# Patient Record
Sex: MALE | Race: WHITE | Employment: OTHER | URBAN - METROPOLITAN AREA
[De-identification: names, ages, dates, MRNs, and addresses within clinical notes are randomized per-mention and may not be internally consistent; named-entity substitution may affect disease eponyms.]

---

## 2017-01-26 PROBLEM — R06.09 DYSPNEA ON EXERTION: Chronic | Status: ACTIVE | Noted: 2017-01-26

## 2020-03-27 PROBLEM — I50.23 ACUTE ON CHRONIC SYSTOLIC HEART FAILURE (HCC): Status: ACTIVE | Noted: 2020-03-27

## 2020-07-02 PROBLEM — I50.22 CHRONIC HFREF (HEART FAILURE WITH REDUCED EJECTION FRACTION) (HCC): Status: ACTIVE | Noted: 2020-07-02

## 2021-01-07 PROBLEM — J61 ASBESTOSIS (HCC): Status: ACTIVE | Noted: 2021-01-07

## 2022-01-01 ENCOUNTER — HOSPITAL ENCOUNTER (INPATIENT)
Age: 72
LOS: 2 days | Discharge: HOME HEALTH | DRG: 291 | End: 2022-01-21
Attending: EMERGENCY MEDICINE | Admitting: INTERNAL MEDICINE
Payer: MEDICARE

## 2022-01-01 ENCOUNTER — HOSPITAL ENCOUNTER (INPATIENT)
Dept: NON INVASIVE DIAGNOSTICS | Age: 72
Discharge: HOME | DRG: 291 | End: 2022-01-19
Attending: INTERNAL MEDICINE
Payer: MEDICARE

## 2022-01-01 ENCOUNTER — APPOINTMENT (OUTPATIENT)
Dept: GENERAL RADIOLOGY | Age: 72
DRG: 291 | End: 2022-01-01
Attending: EMERGENCY MEDICINE
Payer: MEDICARE

## 2022-01-01 VITALS
TEMPERATURE: 98 F | DIASTOLIC BLOOD PRESSURE: 73 MMHG | BODY MASS INDEX: 23.12 KG/M2 | HEIGHT: 68 IN | HEART RATE: 103 BPM | SYSTOLIC BLOOD PRESSURE: 102 MMHG | RESPIRATION RATE: 18 BRPM | OXYGEN SATURATION: 96 % | WEIGHT: 152.56 LBS

## 2022-01-01 VITALS
SYSTOLIC BLOOD PRESSURE: 110 MMHG | HEIGHT: 68 IN | DIASTOLIC BLOOD PRESSURE: 90 MMHG | WEIGHT: 157 LBS | BODY MASS INDEX: 23.79 KG/M2

## 2022-01-01 DIAGNOSIS — J61 ASBESTOSIS (HCC): ICD-10-CM

## 2022-01-01 DIAGNOSIS — I50.9 ACUTE ON CHRONIC CONGESTIVE HEART FAILURE, UNSPECIFIED HEART FAILURE TYPE (HCC): Primary | ICD-10-CM

## 2022-01-01 DIAGNOSIS — I50.23 ACUTE ON CHRONIC SYSTOLIC HEART FAILURE (HCC): ICD-10-CM

## 2022-01-01 DIAGNOSIS — E78.2 MIXED HYPERLIPIDEMIA: ICD-10-CM

## 2022-01-01 DIAGNOSIS — I10 ESSENTIAL HYPERTENSION: ICD-10-CM

## 2022-01-01 DIAGNOSIS — R06.09 DYSPNEA ON EXERTION: Chronic | ICD-10-CM

## 2022-01-01 LAB
ALBUMIN SERPL-MCNC: 2.8 G/DL (ref 3.2–4.6)
ALBUMIN SERPL-MCNC: 2.9 G/DL (ref 3.2–4.6)
ALBUMIN SERPL-MCNC: 3.3 G/DL (ref 3.2–4.6)
ALBUMIN/GLOB SERPL: 0.7 {RATIO} (ref 1.2–3.5)
ALBUMIN/GLOB SERPL: 0.8 {RATIO} (ref 1.2–3.5)
ALBUMIN/GLOB SERPL: 0.8 {RATIO} (ref 1.2–3.5)
ALP SERPL-CCNC: 104 U/L (ref 50–136)
ALP SERPL-CCNC: 80 U/L (ref 50–136)
ALP SERPL-CCNC: 83 U/L (ref 50–136)
ALT SERPL-CCNC: 113 U/L (ref 12–65)
ALT SERPL-CCNC: 130 U/L (ref 12–65)
ALT SERPL-CCNC: 133 U/L (ref 12–65)
ANION GAP SERPL CALC-SCNC: 9 MMOL/L (ref 7–16)
AST SERPL-CCNC: 101 U/L (ref 15–37)
AST SERPL-CCNC: 105 U/L (ref 15–37)
AST SERPL-CCNC: 67 U/L (ref 15–37)
ATRIAL RATE: 109 BPM
BASOPHILS # BLD: 0 K/UL (ref 0–0.2)
BASOPHILS # BLD: 0 K/UL (ref 0–0.2)
BASOPHILS # BLD: 0.1 K/UL (ref 0–0.2)
BASOPHILS NFR BLD: 1 % (ref 0–2)
BILIRUB SERPL-MCNC: 1.7 MG/DL (ref 0.2–1.1)
BILIRUB SERPL-MCNC: 2.2 MG/DL (ref 0.2–1.1)
BILIRUB SERPL-MCNC: 2.4 MG/DL (ref 0.2–1.1)
BNP SERPL-MCNC: 5045 PG/ML (ref 5–125)
BUN SERPL-MCNC: 21 MG/DL (ref 8–23)
CALCIUM SERPL-MCNC: 8.8 MG/DL (ref 8.3–10.4)
CALCIUM SERPL-MCNC: 9 MG/DL (ref 8.3–10.4)
CALCIUM SERPL-MCNC: 9.4 MG/DL (ref 8.3–10.4)
CALCULATED P AXIS, ECG09: 73 DEGREES
CALCULATED R AXIS, ECG10: -4 DEGREES
CALCULATED T AXIS, ECG11: 71 DEGREES
CHLORIDE SERPL-SCNC: 94 MMOL/L (ref 98–107)
CHLORIDE SERPL-SCNC: 94 MMOL/L (ref 98–107)
CHLORIDE SERPL-SCNC: 96 MMOL/L (ref 98–107)
CO2 SERPL-SCNC: 28 MMOL/L (ref 21–32)
CO2 SERPL-SCNC: 29 MMOL/L (ref 21–32)
CO2 SERPL-SCNC: 30 MMOL/L (ref 21–32)
COVID-19 RAPID TEST, COVR: NOT DETECTED
CREAT SERPL-MCNC: 0.88 MG/DL (ref 0.8–1.5)
CREAT SERPL-MCNC: 0.94 MG/DL (ref 0.8–1.5)
CREAT SERPL-MCNC: 1.1 MG/DL (ref 0.8–1.5)
DIAGNOSIS, 93000: NORMAL
DIFFERENTIAL METHOD BLD: ABNORMAL
ECHO AO ASC DIAM: 3 CM
ECHO AO ASCENDING AORTA INDEX: 1.63 CM/M2
ECHO AO ROOT DIAM: 2.9 CM
ECHO AO ROOT INDEX: 1.58 CM/M2
ECHO AV AREA PEAK VELOCITY: 1.2 CM2
ECHO AV AREA VTI: 1.3 CM2
ECHO AV AREA/BSA PEAK VELOCITY: 0.7 CM2/M2
ECHO AV AREA/BSA VTI: 0.7 CM2/M2
ECHO AV MEAN GRADIENT: 3 MMHG
ECHO AV MEAN VELOCITY: 0.8 M/S
ECHO AV PEAK GRADIENT: 5 MMHG
ECHO AV PEAK VELOCITY: 1.1 M/S
ECHO AV VELOCITY RATIO: 0.36
ECHO AV VTI: 15 CM
ECHO EST RA PRESSURE: 15 MMHG
ECHO IVC EXP: 2.2 CM
ECHO LA AREA 2C: 29 CM2
ECHO LA AREA 4C: 26.8 CM2
ECHO LA DIAMETER INDEX: 3.1 CM/M2
ECHO LA DIAMETER: 5.7 CM
ECHO LA MAJOR AXIS: 6.3 CM
ECHO LA MINOR AXIS: 6.5 CM
ECHO LA TO AORTIC ROOT RATIO: 1.97
ECHO LA VOL BP: 99 ML (ref 18–58)
ECHO LA VOL/BSA BIPLANE: 54 ML/M2 (ref 16–34)
ECHO LV E' LATERAL VELOCITY: 18 CM/S
ECHO LV E' SEPTAL VELOCITY: 5 CM/S
ECHO LV EDV A2C: 328 ML
ECHO LV EDV A4C: 272 ML
ECHO LV EDV BP: 303 ML (ref 67–155)
ECHO LV EDV INDEX A4C: 148 ML/M2
ECHO LV EDV INDEX BP: 165 ML/M2
ECHO LV EDV NDEX A2C: 178 ML/M2
ECHO LV EJECTION FRACTION A2C: 19 %
ECHO LV EJECTION FRACTION A4C: 10 %
ECHO LV EJECTION FRACTION BIPLANE: 13 % (ref 55–100)
ECHO LV ESV A2C: 266 ML
ECHO LV ESV A4C: 246 ML
ECHO LV ESV INDEX A2C: 145 ML/M2
ECHO LV ESV INDEX A4C: 134 ML/M2
ECHO LV FRACTIONAL SHORTENING: 5 % (ref 28–44)
ECHO LV INTERNAL DIMENSION DIASTOLE INDEX: 3.37 CM/M2
ECHO LV INTERNAL DIMENSION DIASTOLIC: 6.2 CM (ref 4.2–5.9)
ECHO LV INTERNAL DIMENSION SYSTOLIC INDEX: 3.21 CM/M2
ECHO LV INTERNAL DIMENSION SYSTOLIC: 5.9 CM
ECHO LV IVSD: 1 CM (ref 0.6–1)
ECHO LV MASS 2D: 261 G (ref 88–224)
ECHO LV MASS INDEX 2D: 141.9 G/M2 (ref 49–115)
ECHO LV POSTERIOR WALL DIASTOLIC: 1 CM (ref 0.6–1)
ECHO LV RELATIVE WALL THICKNESS RATIO: 0.32
ECHO LVOT AREA: 3.1 CM2
ECHO LVOT AV VTI INDEX: 0.41
ECHO LVOT DIAM: 2 CM
ECHO LVOT MEAN GRADIENT: 0 MMHG
ECHO LVOT PEAK GRADIENT: 1 MMHG
ECHO LVOT PEAK VELOCITY: 0.4 M/S
ECHO LVOT STROKE VOLUME INDEX: 10.4 ML/M2
ECHO LVOT SV: 19.2 ML
ECHO LVOT VTI: 6.1 CM
ECHO MV E DECELERATION TIME (DT): 173 MS
ECHO MV E VELOCITY: 1.33 M/S
ECHO MV E/E' LATERAL: 7.39
ECHO MV E/E' RATIO (AVERAGED): 16.99
ECHO MV E/E' SEPTAL: 26.6
ECHO MV EROA PISA: 46.9 CM2
ECHO MV REGURGITANT ALIASING (NYQUIST) VELOCITY: 56 CM/S
ECHO MV REGURGITANT PEAK GRADIENT: 92 MMHG
ECHO MV REGURGITANT PEAK VELOCITY: 4.8 M/S
ECHO MV REGURGITANT RADIUS PISA: 0.8 CM
ECHO MV REGURGITANT VOLUME PISA: 5111.08 ML
ECHO MV REGURGITANT VTIA: 109 CM
ECHO RIGHT VENTRICULAR SYSTOLIC PRESSURE (RVSP): 67 MMHG
ECHO RV BASAL DIMENSION: 4.1 CM
ECHO RV INTERNAL DIMENSION: 3.4 CM
ECHO RV MID DIMENSION: 2.7 CM
ECHO RV TAPSE: 1.5 CM (ref 1.5–2)
ECHO TV REGURGITANT MAX VELOCITY: 3.62 M/S
ECHO TV REGURGITANT PEAK GRADIENT: 52 MMHG
EOSINOPHIL # BLD: 0.1 K/UL (ref 0–0.8)
EOSINOPHIL # BLD: 0.2 K/UL (ref 0–0.8)
EOSINOPHIL # BLD: 0.2 K/UL (ref 0–0.8)
EOSINOPHIL NFR BLD: 2 % (ref 0.5–7.8)
EOSINOPHIL NFR BLD: 3 % (ref 0.5–7.8)
EOSINOPHIL NFR BLD: 4 % (ref 0.5–7.8)
ERYTHROCYTE [DISTWIDTH] IN BLOOD BY AUTOMATED COUNT: 13.8 % (ref 11.9–14.6)
ERYTHROCYTE [DISTWIDTH] IN BLOOD BY AUTOMATED COUNT: 13.9 % (ref 11.9–14.6)
ERYTHROCYTE [DISTWIDTH] IN BLOOD BY AUTOMATED COUNT: 14.1 % (ref 11.9–14.6)
GLOBULIN SER CALC-MCNC: 3.7 G/DL (ref 2.3–3.5)
GLOBULIN SER CALC-MCNC: 3.8 G/DL (ref 2.3–3.5)
GLOBULIN SER CALC-MCNC: 4.4 G/DL (ref 2.3–3.5)
GLUCOSE SERPL-MCNC: 119 MG/DL (ref 65–100)
GLUCOSE SERPL-MCNC: 91 MG/DL (ref 65–100)
GLUCOSE SERPL-MCNC: 97 MG/DL (ref 65–100)
HAV IGM SER QL: NONREACTIVE
HBV CORE IGM SER QL: NONREACTIVE
HBV SURFACE AG SER QL: NONREACTIVE
HCT VFR BLD AUTO: 38.7 % (ref 41.1–50.3)
HCT VFR BLD AUTO: 39 % (ref 41.1–50.3)
HCT VFR BLD AUTO: 42.5 % (ref 41.1–50.3)
HCV AB SER QL: NONREACTIVE
HGB BLD-MCNC: 12.3 G/DL (ref 13.6–17.2)
HGB BLD-MCNC: 12.4 G/DL (ref 13.6–17.2)
HGB BLD-MCNC: 13.8 G/DL (ref 13.6–17.2)
IMM GRANULOCYTES # BLD AUTO: 0 K/UL (ref 0–0.5)
IMM GRANULOCYTES NFR BLD AUTO: 0 % (ref 0–5)
LYMPHOCYTES # BLD: 0.9 K/UL (ref 0.5–4.6)
LYMPHOCYTES # BLD: 1.1 K/UL (ref 0.5–4.6)
LYMPHOCYTES # BLD: 1.2 K/UL (ref 0.5–4.6)
LYMPHOCYTES NFR BLD: 12 % (ref 13–44)
LYMPHOCYTES NFR BLD: 19 % (ref 13–44)
LYMPHOCYTES NFR BLD: 22 % (ref 13–44)
MAGNESIUM SERPL-MCNC: 1.9 MG/DL (ref 1.8–2.4)
MAGNESIUM SERPL-MCNC: 2 MG/DL (ref 1.8–2.4)
MAGNESIUM SERPL-MCNC: 2.2 MG/DL (ref 1.8–2.4)
MAGNESIUM SERPL-MCNC: 2.2 MG/DL (ref 1.8–2.4)
MCH RBC QN AUTO: 30 PG (ref 26.1–32.9)
MCH RBC QN AUTO: 30.2 PG (ref 26.1–32.9)
MCH RBC QN AUTO: 30.3 PG (ref 26.1–32.9)
MCHC RBC AUTO-ENTMCNC: 31.8 G/DL (ref 31.4–35)
MCHC RBC AUTO-ENTMCNC: 31.8 G/DL (ref 31.4–35)
MCHC RBC AUTO-ENTMCNC: 32.5 G/DL (ref 31.4–35)
MCV RBC AUTO: 93.2 FL (ref 79.6–97.8)
MCV RBC AUTO: 94.4 FL (ref 79.6–97.8)
MCV RBC AUTO: 94.9 FL (ref 79.6–97.8)
MONOCYTES # BLD: 0.4 K/UL (ref 0.1–1.3)
MONOCYTES # BLD: 0.4 K/UL (ref 0.1–1.3)
MONOCYTES # BLD: 0.5 K/UL (ref 0.1–1.3)
MONOCYTES NFR BLD: 6 % (ref 4–12)
MONOCYTES NFR BLD: 7 % (ref 4–12)
MONOCYTES NFR BLD: 8 % (ref 4–12)
NEUTS SEG # BLD: 3.5 K/UL (ref 1.7–8.2)
NEUTS SEG # BLD: 4 K/UL (ref 1.7–8.2)
NEUTS SEG # BLD: 6.1 K/UL (ref 1.7–8.2)
NEUTS SEG NFR BLD: 66 % (ref 43–78)
NEUTS SEG NFR BLD: 69 % (ref 43–78)
NEUTS SEG NFR BLD: 80 % (ref 43–78)
NRBC # BLD: 0 K/UL (ref 0–0.2)
P-R INTERVAL, ECG05: 180 MS
PLATELET # BLD AUTO: 234 K/UL (ref 150–450)
PLATELET # BLD AUTO: 234 K/UL (ref 150–450)
PLATELET # BLD AUTO: 285 K/UL (ref 150–450)
PMV BLD AUTO: 10 FL (ref 9.4–12.3)
PMV BLD AUTO: 10.3 FL (ref 9.4–12.3)
PMV BLD AUTO: 9.8 FL (ref 9.4–12.3)
POTASSIUM SERPL-SCNC: 3.1 MMOL/L (ref 3.5–5.1)
POTASSIUM SERPL-SCNC: 3.1 MMOL/L (ref 3.5–5.1)
POTASSIUM SERPL-SCNC: 3.2 MMOL/L (ref 3.5–5.1)
POTASSIUM SERPL-SCNC: 3.6 MMOL/L (ref 3.5–5.1)
POTASSIUM SERPL-SCNC: 4.1 MMOL/L (ref 3.5–5.1)
PROT SERPL-MCNC: 6.6 G/DL (ref 6.3–8.2)
PROT SERPL-MCNC: 6.6 G/DL (ref 6.3–8.2)
PROT SERPL-MCNC: 7.7 G/DL (ref 6.3–8.2)
Q-T INTERVAL, ECG07: 358 MS
QRS DURATION, ECG06: 116 MS
QTC CALCULATION (BEZET), ECG08: 482 MS
RBC # BLD AUTO: 4.1 M/UL (ref 4.23–5.6)
RBC # BLD AUTO: 4.11 M/UL (ref 4.23–5.6)
RBC # BLD AUTO: 4.56 M/UL (ref 4.23–5.6)
SODIUM SERPL-SCNC: 132 MMOL/L (ref 138–145)
SODIUM SERPL-SCNC: 133 MMOL/L (ref 136–145)
SODIUM SERPL-SCNC: 133 MMOL/L (ref 138–145)
SOURCE, COVRS: NORMAL
TROPONIN-HIGH SENSITIVITY: 51.2 PG/ML (ref 0–14)
TROPONIN-HIGH SENSITIVITY: 59.7 PG/ML (ref 0–14)
TROPONIN-HIGH SENSITIVITY: 64.4 PG/ML (ref 0–14)
VENTRICULAR RATE, ECG03: 109 BPM
WBC # BLD AUTO: 5.4 K/UL (ref 4.3–11.1)
WBC # BLD AUTO: 5.8 K/UL (ref 4.3–11.1)
WBC # BLD AUTO: 7.7 K/UL (ref 4.3–11.1)

## 2022-01-01 PROCEDURE — 85025 COMPLETE CBC W/AUTO DIFF WBC: CPT

## 2022-01-01 PROCEDURE — 96374 THER/PROPH/DIAG INJ IV PUSH: CPT

## 2022-01-01 PROCEDURE — 36415 COLL VENOUS BLD VENIPUNCTURE: CPT

## 2022-01-01 PROCEDURE — 97530 THERAPEUTIC ACTIVITIES: CPT

## 2022-01-01 PROCEDURE — 74011000250 HC RX REV CODE- 250: Performed by: EMERGENCY MEDICINE

## 2022-01-01 PROCEDURE — 74011250636 HC RX REV CODE- 250/636: Performed by: INTERNAL MEDICINE

## 2022-01-01 PROCEDURE — 74011000250 HC RX REV CODE- 250: Performed by: INTERNAL MEDICINE

## 2022-01-01 PROCEDURE — 83735 ASSAY OF MAGNESIUM: CPT

## 2022-01-01 PROCEDURE — 80053 COMPREHEN METABOLIC PANEL: CPT

## 2022-01-01 PROCEDURE — 74011250636 HC RX REV CODE- 250/636: Performed by: PHYSICIAN ASSISTANT

## 2022-01-01 PROCEDURE — 84132 ASSAY OF SERUM POTASSIUM: CPT

## 2022-01-01 PROCEDURE — 74011250637 HC RX REV CODE- 250/637: Performed by: INTERNAL MEDICINE

## 2022-01-01 PROCEDURE — 65660000000 HC RM CCU STEPDOWN

## 2022-01-01 PROCEDURE — 71045 X-RAY EXAM CHEST 1 VIEW: CPT

## 2022-01-01 PROCEDURE — 84484 ASSAY OF TROPONIN QUANT: CPT

## 2022-01-01 PROCEDURE — 77010033678 HC OXYGEN DAILY

## 2022-01-01 PROCEDURE — 93005 ELECTROCARDIOGRAM TRACING: CPT | Performed by: EMERGENCY MEDICINE

## 2022-01-01 PROCEDURE — 86580 TB INTRADERMAL TEST: CPT | Performed by: INTERNAL MEDICINE

## 2022-01-01 PROCEDURE — 94762 N-INVAS EAR/PLS OXIMTRY CONT: CPT

## 2022-01-01 PROCEDURE — 80074 ACUTE HEPATITIS PANEL: CPT

## 2022-01-01 PROCEDURE — 97161 PT EVAL LOW COMPLEX 20 MIN: CPT

## 2022-01-01 PROCEDURE — C8929 TTE W OR WO FOL WCON,DOPPLER: HCPCS

## 2022-01-01 PROCEDURE — 74011250636 HC RX REV CODE- 250/636: Performed by: EMERGENCY MEDICINE

## 2022-01-01 PROCEDURE — 94760 N-INVAS EAR/PLS OXIMETRY 1: CPT

## 2022-01-01 PROCEDURE — 97116 GAIT TRAINING THERAPY: CPT

## 2022-01-01 PROCEDURE — 74011250637 HC RX REV CODE- 250/637: Performed by: HOSPITALIST

## 2022-01-01 PROCEDURE — 97112 NEUROMUSCULAR REEDUCATION: CPT

## 2022-01-01 PROCEDURE — 97165 OT EVAL LOW COMPLEX 30 MIN: CPT

## 2022-01-01 PROCEDURE — 87635 SARS-COV-2 COVID-19 AMP PRB: CPT

## 2022-01-01 PROCEDURE — 83880 ASSAY OF NATRIURETIC PEPTIDE: CPT

## 2022-01-01 PROCEDURE — 99232 SBSQ HOSP IP/OBS MODERATE 35: CPT | Performed by: INTERNAL MEDICINE

## 2022-01-01 PROCEDURE — 99285 EMERGENCY DEPT VISIT HI MDM: CPT

## 2022-01-01 PROCEDURE — 99223 1ST HOSP IP/OBS HIGH 75: CPT | Performed by: INTERNAL MEDICINE

## 2022-01-01 RX ORDER — SODIUM CHLORIDE 0.9 % (FLUSH) 0.9 %
5-10 SYRINGE (ML) INJECTION EVERY 8 HOURS
Status: DISCONTINUED | OUTPATIENT
Start: 2022-01-01 | End: 2022-01-01 | Stop reason: HOSPADM

## 2022-01-01 RX ORDER — METOPROLOL SUCCINATE 25 MG/1
25 TABLET, EXTENDED RELEASE ORAL DAILY
Status: DISCONTINUED | OUTPATIENT
Start: 2022-01-01 | End: 2022-01-01 | Stop reason: HOSPADM

## 2022-01-01 RX ORDER — ATORVASTATIN CALCIUM 20 MG/1
20 TABLET, FILM COATED ORAL DAILY
Status: DISCONTINUED | OUTPATIENT
Start: 2022-01-01 | End: 2022-01-01 | Stop reason: HOSPADM

## 2022-01-01 RX ORDER — POTASSIUM CHLORIDE 20 MEQ/1
40 TABLET, EXTENDED RELEASE ORAL
Status: COMPLETED | OUTPATIENT
Start: 2022-01-01 | End: 2022-01-01

## 2022-01-01 RX ORDER — SODIUM CHLORIDE 0.9 % (FLUSH) 0.9 %
5-40 SYRINGE (ML) INJECTION EVERY 8 HOURS
Status: DISCONTINUED | OUTPATIENT
Start: 2022-01-01 | End: 2022-01-01 | Stop reason: HOSPADM

## 2022-01-01 RX ORDER — ENOXAPARIN SODIUM 100 MG/ML
40 INJECTION SUBCUTANEOUS DAILY
Status: DISCONTINUED | OUTPATIENT
Start: 2022-01-01 | End: 2022-01-01 | Stop reason: HOSPADM

## 2022-01-01 RX ORDER — FUROSEMIDE 10 MG/ML
80 INJECTION INTRAMUSCULAR; INTRAVENOUS EVERY 12 HOURS
Status: DISCONTINUED | OUTPATIENT
Start: 2022-01-01 | End: 2022-01-01 | Stop reason: HOSPADM

## 2022-01-01 RX ORDER — ONDANSETRON 4 MG/1
4 TABLET, ORALLY DISINTEGRATING ORAL
Status: DISCONTINUED | OUTPATIENT
Start: 2022-01-01 | End: 2022-01-01 | Stop reason: HOSPADM

## 2022-01-01 RX ORDER — BUMETANIDE 1 MG/1
TABLET ORAL
Qty: 45 TABLET | Refills: 0 | Status: SHIPPED | OUTPATIENT
Start: 2022-01-01 | End: 2022-01-01 | Stop reason: DRUGHIGH

## 2022-01-01 RX ORDER — SPIRONOLACTONE 25 MG/1
25 TABLET ORAL DAILY
Status: DISCONTINUED | OUTPATIENT
Start: 2022-01-01 | End: 2022-01-01 | Stop reason: HOSPADM

## 2022-01-01 RX ORDER — FUROSEMIDE 10 MG/ML
60 INJECTION INTRAMUSCULAR; INTRAVENOUS
Status: COMPLETED | OUTPATIENT
Start: 2022-01-01 | End: 2022-01-01

## 2022-01-01 RX ORDER — SODIUM CHLORIDE 0.9 % (FLUSH) 0.9 %
5-10 SYRINGE (ML) INJECTION AS NEEDED
Status: DISCONTINUED | OUTPATIENT
Start: 2022-01-01 | End: 2022-01-01 | Stop reason: HOSPADM

## 2022-01-01 RX ORDER — SODIUM CHLORIDE 0.9 % (FLUSH) 0.9 %
5-40 SYRINGE (ML) INJECTION AS NEEDED
Status: DISCONTINUED | OUTPATIENT
Start: 2022-01-01 | End: 2022-01-01 | Stop reason: HOSPADM

## 2022-01-01 RX ORDER — ACETAMINOPHEN 650 MG/1
650 SUPPOSITORY RECTAL
Status: DISCONTINUED | OUTPATIENT
Start: 2022-01-01 | End: 2022-01-01 | Stop reason: HOSPADM

## 2022-01-01 RX ORDER — CHOLECALCIFEROL (VITAMIN D3) 125 MCG
5 CAPSULE ORAL
Status: DISCONTINUED | OUTPATIENT
Start: 2022-01-01 | End: 2022-01-01

## 2022-01-01 RX ORDER — DIPHENHYDRAMINE HCL 25 MG
25 CAPSULE ORAL ONCE
Status: COMPLETED | OUTPATIENT
Start: 2022-01-01 | End: 2022-01-01

## 2022-01-01 RX ORDER — FUROSEMIDE 10 MG/ML
40 INJECTION INTRAMUSCULAR; INTRAVENOUS EVERY 12 HOURS
Status: DISCONTINUED | OUTPATIENT
Start: 2022-01-01 | End: 2022-01-01

## 2022-01-01 RX ORDER — PANTOPRAZOLE SODIUM 40 MG/1
40 TABLET, DELAYED RELEASE ORAL
Status: DISCONTINUED | OUTPATIENT
Start: 2022-01-01 | End: 2022-01-01 | Stop reason: HOSPADM

## 2022-01-01 RX ORDER — CHOLECALCIFEROL (VITAMIN D3) 125 MCG
5 CAPSULE ORAL
Status: DISCONTINUED | OUTPATIENT
Start: 2022-01-01 | End: 2022-01-01 | Stop reason: HOSPADM

## 2022-01-01 RX ORDER — LOSARTAN POTASSIUM 50 MG/1
50 TABLET ORAL DAILY
Status: DISCONTINUED | OUTPATIENT
Start: 2022-01-01 | End: 2022-01-01 | Stop reason: HOSPADM

## 2022-01-01 RX ORDER — ONDANSETRON 2 MG/ML
4 INJECTION INTRAMUSCULAR; INTRAVENOUS
Status: DISCONTINUED | OUTPATIENT
Start: 2022-01-01 | End: 2022-01-01 | Stop reason: HOSPADM

## 2022-01-01 RX ORDER — POLYETHYLENE GLYCOL 3350 17 G/17G
17 POWDER, FOR SOLUTION ORAL DAILY PRN
Status: DISCONTINUED | OUTPATIENT
Start: 2022-01-01 | End: 2022-01-01 | Stop reason: HOSPADM

## 2022-01-01 RX ORDER — ACETAMINOPHEN 325 MG/1
650 TABLET ORAL
Status: DISCONTINUED | OUTPATIENT
Start: 2022-01-01 | End: 2022-01-01 | Stop reason: HOSPADM

## 2022-01-01 RX ORDER — ASPIRIN 81 MG/1
81 TABLET ORAL DAILY
Status: DISCONTINUED | OUTPATIENT
Start: 2022-01-01 | End: 2022-01-01 | Stop reason: HOSPADM

## 2022-01-01 RX ORDER — SPIRONOLACTONE 25 MG/1
25 TABLET ORAL DAILY
Qty: 30 TABLET | Refills: 0 | Status: SHIPPED | OUTPATIENT
Start: 2022-01-01

## 2022-01-01 RX ADMIN — SODIUM CHLORIDE, PRESERVATIVE FREE 10 ML: 5 INJECTION INTRAVENOUS at 05:07

## 2022-01-01 RX ADMIN — PANTOPRAZOLE SODIUM 40 MG: 40 TABLET, DELAYED RELEASE ORAL at 05:07

## 2022-01-01 RX ADMIN — FUROSEMIDE 80 MG: 10 INJECTION, SOLUTION INTRAMUSCULAR; INTRAVENOUS at 08:39

## 2022-01-01 RX ADMIN — ASPIRIN 81 MG: 81 TABLET ORAL at 08:25

## 2022-01-01 RX ADMIN — LOSARTAN POTASSIUM 50 MG: 50 TABLET, FILM COATED ORAL at 09:05

## 2022-01-01 RX ADMIN — ENOXAPARIN SODIUM 40 MG: 100 INJECTION SUBCUTANEOUS at 08:39

## 2022-01-01 RX ADMIN — POTASSIUM CHLORIDE 40 MEQ: 20 TABLET, EXTENDED RELEASE ORAL at 03:49

## 2022-01-01 RX ADMIN — SODIUM CHLORIDE, PRESERVATIVE FREE 10 ML: 5 INJECTION INTRAVENOUS at 05:03

## 2022-01-01 RX ADMIN — SODIUM CHLORIDE, PRESERVATIVE FREE 10 ML: 5 INJECTION INTRAVENOUS at 14:55

## 2022-01-01 RX ADMIN — TUBERCULIN PURIFIED PROTEIN DERIVATIVE 5 UNITS: 5 INJECTION, SOLUTION INTRADERMAL at 17:49

## 2022-01-01 RX ADMIN — LOSARTAN POTASSIUM 50 MG: 50 TABLET, FILM COATED ORAL at 08:40

## 2022-01-01 RX ADMIN — Medication 5 MG: at 01:32

## 2022-01-01 RX ADMIN — SODIUM CHLORIDE, PRESERVATIVE FREE 10 ML: 5 INJECTION INTRAVENOUS at 21:16

## 2022-01-01 RX ADMIN — POTASSIUM CHLORIDE 40 MEQ: 20 TABLET, EXTENDED RELEASE ORAL at 08:40

## 2022-01-01 RX ADMIN — ASPIRIN 81 MG: 81 TABLET ORAL at 09:05

## 2022-01-01 RX ADMIN — SPIRONOLACTONE 25 MG: 25 TABLET ORAL at 08:40

## 2022-01-01 RX ADMIN — Medication 1 TABLET: at 08:40

## 2022-01-01 RX ADMIN — PERFLUTREN 1 ML: 6.52 INJECTION, SUSPENSION INTRAVENOUS at 15:03

## 2022-01-01 RX ADMIN — FUROSEMIDE 80 MG: 10 INJECTION, SOLUTION INTRAMUSCULAR; INTRAVENOUS at 20:39

## 2022-01-01 RX ADMIN — PANTOPRAZOLE SODIUM 40 MG: 40 TABLET, DELAYED RELEASE ORAL at 07:39

## 2022-01-01 RX ADMIN — ASPIRIN 81 MG: 81 TABLET ORAL at 08:40

## 2022-01-01 RX ADMIN — FUROSEMIDE 60 MG: 10 INJECTION, SOLUTION INTRAMUSCULAR; INTRAVENOUS at 22:10

## 2022-01-01 RX ADMIN — ENOXAPARIN SODIUM 40 MG: 100 INJECTION SUBCUTANEOUS at 08:25

## 2022-01-01 RX ADMIN — SODIUM CHLORIDE, PRESERVATIVE FREE 10 ML: 5 INJECTION INTRAVENOUS at 14:00

## 2022-01-01 RX ADMIN — DIPHENHYDRAMINE HYDROCHLORIDE 25 MG: 25 CAPSULE ORAL at 21:38

## 2022-01-01 RX ADMIN — METOPROLOL SUCCINATE 25 MG: 25 TABLET, EXTENDED RELEASE ORAL at 09:05

## 2022-01-01 RX ADMIN — POTASSIUM CHLORIDE 40 MEQ: 20 TABLET, EXTENDED RELEASE ORAL at 09:17

## 2022-01-01 RX ADMIN — POTASSIUM CHLORIDE 40 MEQ: 20 TABLET, EXTENDED RELEASE ORAL at 03:22

## 2022-01-01 RX ADMIN — METOPROLOL SUCCINATE 25 MG: 25 TABLET, EXTENDED RELEASE ORAL at 08:40

## 2022-01-01 RX ADMIN — METOPROLOL SUCCINATE 25 MG: 25 TABLET, EXTENDED RELEASE ORAL at 08:31

## 2022-01-01 RX ADMIN — SPIRONOLACTONE 25 MG: 25 TABLET ORAL at 09:05

## 2022-01-01 RX ADMIN — SODIUM CHLORIDE, PRESERVATIVE FREE 10 ML: 5 INJECTION INTRAVENOUS at 21:40

## 2022-01-01 RX ADMIN — ATORVASTATIN CALCIUM 20 MG: 40 TABLET, FILM COATED ORAL at 09:05

## 2022-01-01 RX ADMIN — Medication 1 TABLET: at 09:16

## 2022-01-01 RX ADMIN — SPIRONOLACTONE 25 MG: 25 TABLET ORAL at 11:04

## 2022-01-01 RX ADMIN — PANTOPRAZOLE SODIUM 40 MG: 40 TABLET, DELAYED RELEASE ORAL at 03:51

## 2022-01-01 RX ADMIN — ATORVASTATIN CALCIUM 20 MG: 40 TABLET, FILM COATED ORAL at 08:40

## 2022-01-01 RX ADMIN — ENOXAPARIN SODIUM 40 MG: 100 INJECTION SUBCUTANEOUS at 09:05

## 2022-01-01 RX ADMIN — FUROSEMIDE 40 MG: 10 INJECTION, SOLUTION INTRAMUSCULAR; INTRAVENOUS at 08:30

## 2022-01-01 RX ADMIN — LOSARTAN POTASSIUM 50 MG: 50 TABLET, FILM COATED ORAL at 11:04

## 2022-01-01 RX ADMIN — ATORVASTATIN CALCIUM 20 MG: 40 TABLET, FILM COATED ORAL at 08:25

## 2022-01-18 NOTE — ED TRIAGE NOTES
Pt arrives via [facility] to triage. Pt reports shortness of breath for a few months. Pt states he is having some fluid build up in his ankles. Pt states hx of CHF with states compliance with lasix at home. NAD. Masked.

## 2022-01-19 PROBLEM — I50.9 CHF (CONGESTIVE HEART FAILURE) (HCC): Status: ACTIVE | Noted: 2022-01-01
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
## 2022-01-19 NOTE — ED NOTES
70-year-old male presents to the ED with complaints of cough and shortness of breath, concern for CHF exacerbation. They deny fever or chills, no rapid weight gain, hemoptysis, syncope or near syncope, chest pain, other complaint. Completed in the ED by his family, who serves as primary historian. Patient evaluated initially in triage. Rapid Medical Evaluation was conducted and necessary orders have been placed. I have performed a medical screening exam.  Care will now be transferred to the provider in the back of the emergency department.   Justyn Pizano NP 8:01 PM

## 2022-01-19 NOTE — ED NOTES
TRANSFER - OUT REPORT:    Verbal report given to Otilia Kinney RN on Albert Bagley  being transferred to [address] [place] [address] for routine progression of care       Report consisted of patients Situation, Background, Assessment and   Recommendations(SBAR). Information from the following report(s) SBAR, Kardex, ED Summary, Intake/Output, MAR, Accordion and Recent Results was reviewed with the receiving nurse. Lines:   Peripheral IV 01/18/22 Right Antecubital (Active)   Site Assessment Clean, dry, & intact 01/18/22 1458   Phlebitis Assessment 0 01/18/22 1458   Infiltration Assessment 0 01/18/22 1458   Dressing Status Clean, dry, & intact 01/18/22 1458        Opportunity for questions and clarification was provided.       Patient transported with:   [employer]
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
## 2022-01-19 NOTE — PROGRESS NOTES
Pt resting in bed. All needs met. Hourly rounding completed. Bed low & locked position. Side rails up x2. Call light within reach. Will continue to monitor & report to oncoming nurse.

## 2022-01-19 NOTE — PROGRESS NOTES
Chart review complete, CM met with pt at bedside, per pt he lives alone in 2 level home states he only lives on 1st floor and has 4 steps to get into home, states he is on oxygen at home provided by [employer], he drives and is independent with ADLs, drives and affords medications without difficulty. Demographics, insurance and PCP confirmed. CM will remain available, no anticipated dc needs noted at this time. Care Management Interventions  PCP Verified by CM:  Yes ([facility], states has local [facility]iol[facility] Pulmonolgy and see them as needed)  MyChart Signup: No  Discharge Durable Medical Equipment: No  Physical Therapy Consult: No  Occupational Therapy Consult: No  Speech Therapy Consult: No  Support Systems: Child(yane)  Confirm Follow Up Transport: Family  Discharge Location  Patient Expects to be Discharged to[de-identified] Home

## 2022-01-19 NOTE — PROGRESS NOTES
01/19/22 1500   Dual Skin Pressure Injury Assessment   Dual Skin Pressure Injury Assessment WDL   Second Care Provider (Based on [address] [place] [address]) Marcy URBINA   Skin Integumentary   Skin Integumentary (WDL) WDL    Pressure  Injury Documentation No Pressure Injury Noted-Pressure Ulcer Prevention Initiated   Wound Prevention and Protection Methods   Orientation of Wound Prevention Posterior   Location of Wound Prevention Sacrum/Coccyx   Dressing Present  No   Wound Offloading (Prevention Methods) Bed, pressure reduction mattress;Repositioning

## 2022-01-19 NOTE — ACP (ADVANCE CARE PLANNING)
Advance Care Planning   Healthcare Decision Maker:   Primary decision maker is daughter Rachel Livingston [phone]        Click here to complete [phone] including selection of the Healthcare Decision Maker Relationship (ie \"Primary\")  Today we documented Decision Maker(s) consistent with Legal Next of Kin hierarchy.

## 2022-01-19 NOTE — PROGRESS NOTES
Attempted to round and getting ECHO, tech requested I return when study completed. admitted after midnight, chart reviewed. 71 yo male with PMH of CHF/ HFrEF admitted with acute on chronic hypoxic respiratory failure due to CHF exacerbation. COVID negative. CXR shows cardiomegaly. ECHO ordered. Receiving IV lasix every 12 hours. Has elevated LFTS possible congestive. Will followup labs and hepatitis panel. Continued diuresis. Wean O2 as tolerant.      Bernardo Willingham MD

## 2022-01-20 NOTE — PROGRESS NOTES
1/20/2022   OT order received and chart reviewed. Arrived and lights were out in the room. Despite encouragement for participation pt reports he is taking a nap and already warm in the bed. Pt declining participation today.   Thank you,  Thierno Quiroz, OT

## 2022-01-20 NOTE — PROGRESS NOTES
Hospitalist Progress Note   Admit Date:  2022  9:43 PM   Name:  Rukhsana Liriano   Age:  70 y.o. Sex:  male  :  1950   MRN:  [id number]   Room:  [facility]    Presenting Complaint: Peripheral Edema and Shortness of Breath    Reason(s) for Admission: CHF (congestive heart failure) [facility]) [I50.9]     Hospital Course & Interval History:   Rukhsana Liriano is a 70 y.o. male with medical history of previous congestive heart failure unknown ejection fraction of around 15% based on his prior echocardiograms, asbestosis along with hypertension at baseline. He presents to the hospital stating that he has been feeling poorly over the last week. Minimal amounts of activity are making him feel short of breath. He does endorse having some orthopnea with this as well. He denies any chest pain, palpitations, cough, congestion or productive sputum. He states that he does use oxygen as needed between 2 to 3 L and has been wearing his oxygen but despite this had no relief at home. His legs have progressively become more swollen. In the emergency room his BNP level was noted to be 5045. Chest x-ray revealed cardiomegaly. Clinically he does appear to be volume overloaded and likely having an exacerbation of his CHF so he was referred to the medical service for admission. Subjective (22): Patient found ambulating in room on 3L NC without distress. He denies chest pain/pressure, cough. Says his legs are still swollen. Explained to him his degree of heart failure. 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 - Continue aldactone 25mg daily  - Reduce Cozaar to 25mg (from 50mg) given borderline BP's  - Toprol xl 25mg daily.   - Discussed prognosis w/ patient's daughter, April, who is an RN.  She is requesting home health for symptoms management and I suggested PC for home if this can be arranged. Daughter is agreeable. Will d/w CM in AM    # NSVT  1/20  - Replete electrolytes  - pt has declined offer for defibrilator in the past  - continue telemetry  - Cardiology following - may need increase in BB if BP tolerates    # Hypokalemia  - replete, repeat in AM with mg    # HLP  - statin    # HTN  - borderline HYPOtensive as above  - reduce cozaar and monitor    # elevated LFT  - suspected congestive hepatopathy.   - hep panel negative  - monitor    # Chronic respiratory failure/ asbestosis  - on 3L NC at home  - continue supplemental oxygen           Dispo/Discharge Planning:    Pending, likely home with HHC vs PC in 1-2 days. Diet:  ADULT DIET Regular; Low Fat/Low Chol/High Fiber/2 gm Na;  Low Sodium (2 gm); 1200 ml  DVT PPx:   Code status: Full Code    Hospital Problems as of 1/20/2022 Date Reviewed: 1/19/2022          Codes Class Noted - Resolved POA    * (Principal) CHF (congestive heart failure) (HCC) ICD-10-CM: I50.9  ICD-9-CM: 428.0  1/19/2022 - Present Yes        Asbestosis ([address] ICD-10-CM: W40  ICD-9-CM: 672  1/7/2021 - Present Yes        Dyspnea on exertion (Chronic) ICD-10-CM: R06.00  ICD-9-CM: 786.09  1/26/2017 - Present Yes        Hyperlipidemia ICD-10-CM: E78.5  ICD-9-CM: 272.4  8/27/2015 - Present Yes        Essential hypertension ICD-10-CM: I10  ICD-9-CM: 401.9  8/27/2015 - Present Yes              Objective:     Patient Vitals for the past 24 hrs:   Temp Pulse Resp BP SpO2   01/20/22 1631 97.6 °F (36.4 °C) 97 18 93/70 99 %   01/20/22 1102 98.1 °F (36.7 °C) (!) 109 18 113/77 100 %   01/20/22 0840     97 %   01/20/22 0827 98.2 °F (36.8 °C) (!) 108 20 (!) 110/57 96 %   01/20/22 0418 98.9 °F (37.2 °C) (!) 104 18 94/65 99 %   01/20/22 0124 98.1 °F (36.7 °C) (!) 105 18 115/76 99 %   01/19/22 1927 97.8 °F (36.6 °C) (!) 115 18 124/69 98 %     Oxygen Therapy  O2 Sat (%): 99 % (01/20/22 1631)  Pulse via Oximetry: 108 beats per minute (01/19/22 1221)  O2 Device: Nasal cannula (01/20/22 0840)  Skin Assessment: Clean, dry, & intact (01/19/22 1927)  O2 Flow Rate (L/min): 3 l/min (01/20/22 0840)    Estimated body mass index is 23.9 kg/m² as calculated from the following:    Height as of 1/19/22: 5' 8\" (1.727 m). Weight as of this encounter: 71.3 kg (157 lb 3.2 oz). Intake/Output Summary (Last 24 hours) at 1/20/2022 1827  Last data filed at 1/20/2022 1330  Gross per 24 hour   Intake 660 ml   Output 150 ml   Net 510 ml         Physical Exam:     Blood pressure 93/70, pulse 97, temperature 97.6 °F (36.4 °C), resp. rate 18, height 5' 8\" (1.727 m), weight 71.3 kg (157 lb 3.2 oz), SpO2 99 %. General:    Well nourished. No overt distress  Head:  Normocephalic, atraumatic  Eyes:  Sclerae appear normal.  Pupils equally round. ENT:  Nares appear normal, no drainage. Moist oral mucosa  Neck:  No restricted ROM. Trachea midline   CV:   RRR. No m/r/g. No jugular venous distension. Lungs:   CTAB. No wheezing, rhonchi, or rales. Respirations even, unlabored  Abdomen: Bowel sounds present. Soft, nontender, nondistended. Extremities: No cyanosis or clubbing. +1 pitting edema  Skin:     No rashes and normal coloration. Warm and dry. Neuro:  CN II-XII grossly intact. Sensation intact. A&Ox3  Psych:  Normal mood and affect.       I have reviewed ordered lab tests and independently visualized imaging below:    Recent Labs:  Recent Results (from the past 48 hour(s))   COVID-19 RAPID TEST    Collection Time: 01/18/22 10:08 PM   Result Value Ref Range    Specimen source Nasopharyngeal      COVID-19 rapid test Not detected NOTD     TROPONIN-HIGH SENSITIVITY    Collection Time: 01/18/22 10:09 PM   Result Value Ref Range    Troponin-High Sensitivity 59.7 (H) 0 - 14 pg/mL   TROPONIN-HIGH SENSITIVITY    Collection Time: 01/19/22  3:01 AM   Result Value Ref Range    Troponin-High Sensitivity 64.4 (H) 0 - 14 pg/mL   ECHO ADULT COMPLETE    Collection Time: 01/19/22  2:00 PM   Result Value Ref Range    LV EDV A2C 328 mL    LV EDV A4C 272 mL    LV EDV  (A) 67 - 155 mL    LV ESV A2C 266 mL    LV ESV A4C 246 mL    IVSd 1.0 0.6 - 1.0 cm    LVIDd 6.2 (A) 4.2 - 5.9 cm    LVIDs 5.9 cm    LVOT Diameter 2.0 cm    LVOT Mean Gradient 0 mmHg    LVOT VTI 6.1 cm    LVOT Peak Velocity 0.4 m/s    LVOT Peak Gradient 1 mmHg    LVPWd 1.0 0.6 - 1.0 cm    LV E' Lateral Velocity 18 cm/s    LV E' Septal Velocity 5 cm/s    LV Ejection Fraction A2C 19 %    LV Ejection Fraction A4C 10 %    EF BP 13 (A) 55 - 100 %    LVOT Area 3.1 cm2    LVOT SV 19.2 ml    LA Minor Axis 6.5 cm    LA Major Axis 6.3 cm    LA Area 2C 29.0 cm2    LA Area 4C 26.8 cm2    LA Volume BP 99 (A) 18 - 58 mL    LA Diameter 5.7 cm    AV Mean Gradient 3 mmHg    AV VTI 15.0 cm    AV Mean Velocity 0.8 m/s    AV Peak Velocity 1.1 m/s    AV Peak Gradient 5 mmHg    AV Area by VTI 1.3 cm2    AV Area by Peak Velocity 1.2 cm2    Aortic Root 2.9 cm    Ascending Aorta 3.0 cm    IVC Expiration 2.2 cm    MV Nyquist Velocity 56 cm/s    MR Radius PISA 0.80 cm    MR .0 cm    MR Peak Velocity 4.8 m/s    MR Peak Gradient 92 mmHg    MV E Wave Deceleration Time 173.0 ms    MV E Velocity 1.33 m/s    MV EROA PISA 46.9 cm2    RVIDd 3.4 cm    RV Basal Dimension 4.1 cm    RV Mid Dimension 2.7 cm    TAPSE 1.5 1.5 - 2.0 cm    TR Max Velocity 3.62 m/s    TR Peak Gradient 52 mmHg    Fractional Shortening 2D 5 28 - 44 %    LV EDV Index  mL/m2    LV ESV Index A4C 134 mL/m2    LV EDV Index A4C 148 mL/m2    LV ESV Index A2C 145 mL/m2    LV EDV Index A2C 178 mL/m2    LVIDd Index 3.37 cm/m2    LVIDs Index 3.21 cm/m2    LV RWT Ratio 0.32     LV Mass 2D 261.0 (A) 88 - 224 g    LV Mass 2D Index 141.9 (A) 49 - 115 g/m2    E/E' Ratio (Averaged) 16.99     E/E' Lateral 7.39     E/E' Septal 26.60     LA Volume Index BP 54 (A) 16 - 34 ml/m2    LVOT Stroke Volume Index 10.4 mL/m2    LA Size Index 3.10 cm/m2    LA/AO Root Ratio 1.97     Ao Root Index 1.58 cm/m2    Ascending Aorta Index 1.63 cm/m2    AV Velocity Ratio 0.36     LVOT:AV VTI Index 0.41     VIKTORIYA/BSA VTI 0.7 cm2/m2    VIKTORIYA/BSA Peak Velocity 0.7 cm2/m2    MR Regurg Volume PISA 5,111.08 mL    Est. RA Pressure 15 mmHg    RVSP 67 mmHg   MAGNESIUM    Collection Time: 01/19/22 11:24 PM   Result Value Ref Range    Magnesium 2.2 1.8 - 2.4 mg/dL   POTASSIUM    Collection Time: 01/19/22 11:24 PM   Result Value Ref Range    Potassium 3.1 (L) 3.5 - 5.1 mmol/L   METABOLIC PANEL, COMPREHENSIVE    Collection Time: 01/20/22  5:34 AM   Result Value Ref Range    Sodium 133 (L) 136 - 145 mmol/L    Potassium 3.1 (L) 3.5 - 5.1 mmol/L    Chloride 94 (L) 98 - 107 mmol/L    CO2 30 21 - 32 mmol/L    Anion gap 9 7 - 16 mmol/L    Glucose 97 65 - 100 mg/dL    BUN 21 8 - 23 MG/DL    Creatinine 0.88 0.8 - 1.5 MG/DL    GFR est AA >60 >60 ml/min/1.73m2    GFR est non-AA >60 >60 ml/min/1.73m2    Calcium 9.0 8.3 - 10.4 MG/DL    Bilirubin, total 2.4 (H) 0.2 - 1.1 MG/DL    ALT (SGPT) 133 (H) 12 - 65 U/L    AST (SGOT) 101 (H) 15 - 37 U/L    Alk.  phosphatase 83 50 - 136 U/L    Protein, total 6.6 6.3 - 8.2 g/dL    Albumin 2.9 (L) 3.2 - 4.6 g/dL    Globulin 3.7 (H) 2.3 - 3.5 g/dL    A-G Ratio 0.8 (L) 1.2 - 3.5     MAGNESIUM    Collection Time: 01/20/22  5:34 AM   Result Value Ref Range    Magnesium 2.2 1.8 - 2.4 mg/dL   CBC WITH AUTOMATED DIFF    Collection Time: 01/20/22  5:34 AM   Result Value Ref Range    WBC 5.4 4.3 - 11.1 K/uL    RBC 4.10 (L) 4.23 - 5.6 M/uL    HGB 12.3 (L) 13.6 - 17.2 g/dL    HCT 38.7 (L) 41.1 - 50.3 %    MCV 94.4 79.6 - 97.8 FL    MCH 30.0 26.1 - 32.9 PG    MCHC 31.8 31.4 - 35.0 g/dL    RDW 13.9 11.9 - 14.6 %    PLATELET 139 888 - 125 K/uL    MPV 10.3 9.4 - 12.3 FL    ABSOLUTE NRBC 0.00 0.0 - 0.2 K/uL    DF AUTOMATED      NEUTROPHILS 66 43 - 78 %    LYMPHOCYTES 22 13 - 44 %    MONOCYTES 8 4.0 - 12.0 %    EOSINOPHILS 4 0.5 - 7.8 %    BASOPHILS 1 0.0 - 2.0 %    IMMATURE GRANULOCYTES 0 0.0 - 5.0 %    ABS. NEUTROPHILS 3.5 1.7 - 8.2 K/UL    ABS. LYMPHOCYTES 1.2 0.5 - 4.6 K/UL    ABS. MONOCYTES 0.4 0.1 - 1.3 K/UL    ABS. EOSINOPHILS 0.2 0.0 - 0.8 K/UL    ABS. BASOPHILS 0.0 0.0 - 0.2 K/UL    ABS. IMM. GRANS. 0.0 0.0 - 0.5 K/UL   HEPATITIS PANEL, ACUTE    Collection Time: 01/20/22  5:34 AM   Result Value Ref Range    Hepatitis A, IgM NONREACTIVE NR      Hepatitis B core, IgM NONREACTIVE NR      Hep B Surface Ag NONREACTIVE NR      Hepatitis C virus Ab NONREACTIVE NR         All Micro Results     Procedure Component Value Units Date/Time    COVID-19 RAPID TEST [[id number]] Collected: 01/18/22 2208    Order Status: Completed Specimen: Nasopharyngeal Updated: 01/18/22 2311     Specimen source Nasopharyngeal        COVID-19 rapid test Not detected        Comment:      The specimen is NEGATIVE for SARS-CoV-2, the novel coronavirus associated with COVID-19. A negative result does not rule out COVID-19. This test has been authorized by the FDA under an Emergency Use Authorization (EUA) for use by authorized laboratories. Fact sheet for Healthcare Providers: ConventionUpdate.co.nz  Fact sheet for Patients: ConventionUpdate.co.nz       Methodology: Isothermal Nucleic Acid Amplification               Other Studies:  No results found.     Current Meds:  Current Facility-Administered Medications   Medication Dose Route Frequency    mv,Ca,min-folic acid-vit K1 (OPTIFAST) chewable tablet 1 Tablet  1 Tablet Oral DAILY    furosemide (LASIX) injection 80 mg  80 mg IntraVENous Q12H    aspirin delayed-release tablet 81 mg  81 mg Oral DAILY    atorvastatin (LIPITOR) tablet 20 mg  20 mg Oral DAILY    pantoprazole (PROTONIX) tablet 40 mg  40 mg Oral ACB    losartan (COZAAR) tablet 50 mg  50 mg Oral DAILY    metoprolol succinate (TOPROL-XL) XL tablet 25 mg  25 mg Oral DAILY    spironolactone (ALDACTONE) tablet 25 mg  25 mg Oral DAILY    sodium chloride (NS) flush 5-40 mL  5-40 mL IntraVENous Q8H    sodium chloride (NS) flush 5-40 mL  5-40 mL IntraVENous PRN    acetaminophen (TYLENOL) tablet 650 mg  650 mg Oral Q6H PRN    Or    acetaminophen (TYLENOL) suppository 650 mg  650 mg Rectal Q6H PRN    polyethylene glycol (MIRALAX) packet 17 g  17 g Oral DAILY PRN    ondansetron (ZOFRAN ODT) tablet 4 mg  4 mg Oral Q8H PRN    Or    ondansetron (ZOFRAN) injection 4 mg  4 mg IntraVENous Q6H PRN    enoxaparin (LOVENOX) injection 40 mg  40 mg SubCUTAneous DAILY    sodium chloride (NS) flush 5-10 mL  5-10 mL IntraVENous Q8H    sodium chloride (NS) flush 5-10 mL  5-10 mL IntraVENous PRN       Signed:  Amber Gandhi DO    Part of this note may have been written by using a voice dictation software. The note has been proof read but may still contain some grammatical/other typographical errors.

## 2022-01-20 NOTE — ROUTINE PROCESS
CHF teaching started post introduction to pt.; aware of diagnosis. Planner/scale(home) @ BS and will follow. Smoking/ ETOH/Illicit drug use cessation and maintain a healthy weight covered. Pt. aware that I can not prescribe nor adjust  medications: 15mins  Palliative Care score: on hold  Refused ACP on admission  Start 2L/D Fluid restriction/ cardiac diet  CHF teaching continues to pt. . Emphasis on taking prescription meds as ordered, to keep F/U appts and to call MD STAT if any of the following occur:   If you gain 2 lbs in one day or 5 lbs in a week, and short of breath.  If you can not lay flat without developing short of breath or rapid breathing at night; or if it wakes you up. Develop a cough or wheezing.  If you notice swollen hands/feet/ankles or stomach with a bloated/ full feeling.  If you become confused or mentally fuzzy or dizzy.  If you notice a rapid or change in your heart rate.  If you become more exhausted all the time and unable to do the same level of activity without stopping to catch your breath. Drink no more than 8 cups a day in 8 oz. cups. Your Heart can not handle any more. Stay away from salt (limit anything with salt or sodium in it). Limit to 250mg per serving. Pt. verbalizes understanding, will follow to reinforce teaching skills: 20 mins    Cardiology to see    Advised to stop SEA SALT consumption.

## 2022-01-20 NOTE — PROGRESS NOTES
CM received consult. CM met with patient to discuss discharge planning. Per therapy evaluation this day, the patient has been recommended [facility] therapy. Patient is agreeable to [facility] services. List of [facility] agencies provided. CM will obtain preferred [facility] choice, after patient has reviewed list with family. CM continues to follow plan of care.

## 2022-01-20 NOTE — PROGRESS NOTES
Problem: Falls - Risk of  Goal: *Absence of Falls  Description: Document Anu Roberts Fall Risk and appropriate interventions in the flowsheet.   Outcome: Progressing Towards Goal  Note: Fall Risk Interventions:            Medication Interventions: Evaluate medications/consider consulting pharmacy,Patient to call before getting OOB,Teach patient to arise slowly                   Problem: Patient Education: Go to Patient Education Activity  Goal: Patient/Family Education  Outcome: Progressing Towards Goal     Problem: Patient Education: Go to Patient Education Activity  Goal: Patient/Family Education  Outcome: Progressing Towards Goal     Problem: Heart Failure: Day 1  Goal: Off Pathway (Use only if patient is Off Pathway)  Outcome: Progressing Towards Goal  Goal: Activity/Safety  Outcome: Progressing Towards Goal  Goal: Consults, if ordered  Outcome: Progressing Towards Goal  Goal: Diagnostic Test/Procedures  Outcome: Progressing Towards Goal  Goal: Nutrition/Diet  Outcome: Progressing Towards Goal  Goal: Discharge Planning  Outcome: Progressing Towards Goal  Goal: Medications  Outcome: Progressing Towards Goal  Goal: Respiratory  Outcome: Progressing Towards Goal  Goal: Treatments/Interventions/Procedures  Outcome: Progressing Towards Goal  Goal: Psychosocial  Outcome: Progressing Towards Goal  Goal: *Oxygen saturation within defined limits  Outcome: Progressing Towards Goal  Goal: *Hemodynamically stable  Outcome: Progressing Towards Goal  Goal: *Optimal pain control at patient's stated goal  Outcome: Progressing Towards Goal  Goal: *Anxiety reduced or absent  Outcome: Progressing Towards Goal     Problem: Heart Failure: Day 2  Goal: Off Pathway (Use only if patient is Off Pathway)  Outcome: Progressing Towards Goal  Goal: Activity/Safety  Outcome: Progressing Towards Goal  Goal: Consults, if ordered  Outcome: Progressing Towards Goal  Goal: Diagnostic Test/Procedures  Outcome: Progressing Towards Goal  Goal: Nutrition/Diet  Outcome: Progressing Towards Goal  Goal: Discharge Planning  Outcome: Progressing Towards Goal  Goal: Medications  Outcome: Progressing Towards Goal  Goal: Respiratory  Outcome: Progressing Towards Goal  Goal: Treatments/Interventions/Procedures  Outcome: Progressing Towards Goal  Goal: Psychosocial  Outcome: Progressing Towards Goal  Goal: *Oxygen saturation within defined limits  Outcome: Progressing Towards Goal  Goal: *Hemodynamically stable  Outcome: Progressing Towards Goal  Goal: *Optimal pain control at patient's stated goal  Outcome: Progressing Towards Goal  Goal: *Anxiety reduced or absent  Outcome: Progressing Towards Goal  Goal: *Demonstrates progressive activity  Outcome: Progressing Towards Goal

## 2022-01-20 NOTE — PROGRESS NOTES
Monitor room called to inform pt had a 11 beat run of v-tach. MD notified via [employer]. Orders received to check Mg and K level.

## 2022-01-20 NOTE — PROGRESS NOTES
ACUTE PHYSICAL THERAPY GOALS:  (Developed with and agreed upon by patient and/or caregiver.)  1. Pt will ambulate 500 ft under (S) with breaks as needed in 7 therapy sessions. 2. Pt will perform standing dynamic balance activities with minimal postural sway in 7 therapy sessions. 3. Pt will tolerate multiple sets and reps of BLE exercises in 7 therapy sessions. PHYSICAL THERAPY ASSESSMENT: Initial Assessment and AM PT Treatment Day # 1      Danny Manriquez is a 70 y.o. male   PRIMARY DIAGNOSIS: CHF (congestive heart failure) ([facility])  [facility]congest[facility] heart failure[facility] [I50.9]       Reason for Referral:    ICD-10: Treatment Diagnosis: Generalized Muscle Weakness (M62.81)  Difficulty in walking, Not elsewhere classified (R26.2)  Other abnormalities of gait and mobility (R26.89)  INPAT[place]: [place] / [place] / Product Type: Medicare /     ASSESSMENT:     REHAB RECOMMENDATIONS:   Recommendation to date pending progress:  [address] [place] [address]  Equipment:    None   To Be Determined     PRIOR LEVEL OF FUNCTION:  (Prior to Hospitalization) INITIAL/CURRENT LEVEL OF FUNCTION:  (Most Recently Demonstrated)   Bed Mobility:   Independent  Sit to Stand:   Independent  Transfers:   Independent  Gait/Mobility:   Independent Bed Mobility:   Modified Independent  Sit to Stand:   Modified Independent  Transfers:   Modified Independent  Gait/Mobility:   Standby Assistance     ASSESSMENT:  Mr. Brice Little Is a 70 y.o M c hx of CHF presenting to PT after coming to ED on  c/o SOB, ALEXA ankle swelling and fatigue. PTA pt lives alone in a single level home c [address] and was functionally (I) at baseline for all needs; including driving. Pt reports he wears 2L O2 at home and has varius canes/ walkers but is unsure of exactly what type.  At time of initial evaluation, pt presents slightly below baseline LOF with deficits in standing dynamic balance, gait and activity tolerance limiting his overall functional mobility. Today, pt performed all mobility Mod (I) with increased time while requiring SB (A) for ambulation. Of note, pt moves fairly well on his feet although he does ambulate c dec virginie and inc postural sway d/t residual swelling in BLE. Pt was on RA upon entry to room and sating at 83%; his SpO2 quickly improved to 97% once he was placed back on 3L NC. Pt was able to ambulate 30'x1 without (A) from therapist although SB (A) was provided d/t intermittent unsteadiness and miss-steps. At this time, pt is an appropriate candidate for skilled PT and will benefit from POC designed to address the aforementioned deficits. Upon completion of treatment, pt was positioned to comfort in bed with needs in reach. RN was made aware of pt performance. DC Recommendation: Home c HH      SUBJECTIVE:   Mr. Jimenez Else states, \"My daughter and Ex-wife live close by and can help if I need it\"    SOCIAL HISTORY/LIVING ENVIRONMENT: lives alone in a single level home c [address]; Functionally (I) at baseline for all needs; including driving. 2L O2 at home and has varius canes/ walkers but is unsure of exactly what type. DTR and ex-wife live locally to (A) [place].    Home Environment: Private residence  # Steps to Enter: 3  One/Two Story Residence: One story  Living Alone: Yes  Support Systems: Child(yane)  OBJECTIVE:     PAIN: VITAL SIGNS: LINES/DRAINS:   Pre Treatment: Pain Screen  Pain Scale 1: Numeric (0 - 10)  Pain Intensity 1: 0  Patient Stated Pain Goal: 0  Post Treatment: 0 Vital Signs  O2 Sat (%): 97 %  O2 Device: Nasal cannula  O2 Flow Rate (L/min): 3 l/min NA  O2 Device: Nasal cannula     GROSS EVALUATION:  BLE Within Functional Limits Abnormal/ Functional Abnormal/ Non-Functional (see comments) Not Tested Comments:   AROM [x] [] [] []    PROM [] [] [] []    Strength [x] [] [] []    Balance [] [x] [] [] Inc postural sway and miss-steps   Posture [x] [] [] []    Sensation [x] [] [] []    Coordination [x] [] [] []    Tone [x] [] [] []    Edema [] [x] [] [] Residual swelling in BLE    Activity Tolerance [] [x] [] [] Below baseline level     [] [] [] []      COGNITION/  PERCEPTION: Intact Impaired   (see comments) Comments:   Orientation [x] []    Vision [x] []    Hearing [x] []    Command Following [x] []    Safety Awareness [x] []     [] []      MOBILITY: I Mod I S SBA CGA Min Mod Max Total  NT x2 Comments:   Bed Mobility    Rolling [] [] [] [] [] [] [] [] [] [x] []    Supine to Sit [] [x] [] [] [] [] [] [] [] [] []    Scooting [] [x] [] [] [] [] [] [] [] [] []    Sit to Supine [] [x] [] [] [] [] [] [] [] [] []    Transfers    Sit to Stand [] [x] [] [] [] [] [] [] [] [] []    Bed to Chair [] [x] [] [] [] [] [] [] [] [x] []    Stand to Sit [] [x] [] [] [] [] [] [] [] [] []    I=Independent, Mod I=Modified Independent, S=Supervision, SBA=Standby Assistance, CGA=Contact Guard Assistance,   Min=Minimal Assistance, Mod=Moderate Assistance, Max=Maximal Assistance, Total=Total Assistance, NT=Not Tested  GAIT: I Mod I S SBA CGA Min Mod Max Total  NT x2 Comments:   Level of Assistance [] [] [] [x] [] [] [] [] [] [] []    Distance 30'x1    DME None    Gait Quality Dec virginie, inc postural sway, intermittent miss-steps    Weightbearing Status N/A     I=Independent, Mod I=Modified Independent, S=Supervision, SBA=Standby Assistance, CGA=Contact Guard Assistance,   Min=Minimal Assistance, Mod=Moderate Assistance, Max=Maximal Assistance, Total=Total Assistance, NT=Not Tested    [facility] [address] [id number] AM-PAC 6 Clicks   Basic Mobility Inpatient Short Form       How much difficulty does the patient currently have. .. Unable A Lot A Little None   1. Turning over in bed (including adjusting bedclothes, sheets and blankets)? [] 1   [] 2   [] 3   [x] 4   2. Sitting down on and standing up from a chair with arms ( e.g., wheelchair, bedside commode, etc.)   [] 1   [] 2   [] 3   [x] 4   3.   Moving from lying on back to sitting on the side of the bed?   [] 1   [] 2   [] 3   [x] 4   How much help from another person does the patient currently need. .. Total A Lot A Little None   4. Moving to and from a bed to a chair (including a wheelchair)? [] 1   [] 2   [] 3   [x] 4   5. Need to walk in hospital room? [] 1   [] 2   [x] 3   [] 4   6. Climbing 3-5 steps with a railing? [] 1   [] 2   [x] 3   [] 4   © 2007, Trustees of [address] [place] [address] [address] [id number], under license to [employer]. All rights reserved     Score:  Initial: 22 Most Recent: X (Date: -- )    Interpretation of Tool:  Represents activities that are increasingly more difficult (i.e. Bed mobility, Transfers, Gait). PLAN:   FREQUENCY/DURATION: PT Plan of Care: 3 times/week for duration of hospital stay or until stated goals are met, whichever comes first.    PROBLEM LIST:   (Skilled intervention is medically necessary to address:)  1. Decreased Activity Tolerance  2. Decreased Balance  3. Decreased Gait Ability   INTERVENTIONS PLANNED:   (Benefits and precautions of physical therapy have been discussed with the patient.)  1. Therapeutic Activity  2. Therapeutic Exercise/HEP  3. Neuromuscular Re-education  4. Gait Training  5. Manual Therapy  6. Education     TREATMENT:     EVALUATION: Low Complexity : (Untimed Charge)    TREATMENT:   ($$ Therapeutic Activity: 8-22 mins    )  Therapeutic Activity (8 Minutes): Therapeutic activity included Supine to Sit, Scooting, Ambulation on level ground, Sitting balance  and Standing balance to improve functional Mobility, Strength and Activity tolerance.     TREATMENT GRID:  N/A    AFTER TREATMENT POSITION/PRECAUTIONS:  Bed, Needs within reach and RN notified    INTERDISCIPLINARY COLLABORATION:  RN/PCT    TOTAL TREATMENT DURATION:  PT Patient Time In/Time Out  Time In: 0840  Time Out: [address], PT
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
## 2022-01-20 NOTE — DISCHARGE INSTRUCTIONS
Patient Education        Heart Failure: Care Instructions  Your Care Instructions     Heart failure occurs when your heart does not pump as much blood as the body needs. Failure does not mean that the heart has stopped pumping but rather that it is not pumping as well as it should. Over time, this causes fluid buildup in your lungs and other parts of your body. Fluid buildup can cause shortness of breath, fatigue, swollen ankles, and other problems. By taking medicines regularly, reducing sodium (salt) in your diet, checking your weight every day, and making lifestyle changes, you can feel better and live longer. Follow-up care is a key part of your treatment and safety. Be sure to make and go to all appointments, and call your doctor if you are having problems. It's also a good idea to know your test results and keep a list of the medicines you take. How can you care for yourself at home? Medicines    · Be safe with medicines. Take your medicines exactly as prescribed. Call your doctor if you think you are having a problem with your medicine.     · Do not take any vitamins, over-the-counter medicine, or herbal products without talking to your doctor first. Phoenix Castros not take ibuprofen (Advil or Motrin) and naproxen (Aleve) without talking to your doctor first. They could make your heart failure worse.     · You may take some of the following medicine. ? Angiotensin-converting enzyme inhibitors (ACEIs) or angiotensin II receptor blockers (ARBs) reduce the heart's workload, lower blood pressure, and reduce swelling. Taking an ACEI or ARB may lower your chance of needing to be hospitalized. ? Beta-blockers can slow heart rate, decrease blood pressure, and improve your condition. Taking a beta-blocker may lower your chance of needing to be hospitalized. ? Diuretics, also called water pills, reduce swelling. You will get more details on the specific medicines your doctor prescribes.   Diet    · Your doctor may suggest that you limit sodium. Your doctor can tell you how much sodium is right for you. An example is less than 3,000 mg a day. This includes all the salt you eat in cooking or in packaged foods. People get most of their sodium from processed foods. Fast food and restaurant meals also tend to be very high in sodium.     · Ask your doctor how much liquid you can drink each day. You may have to limit liquids. Weight    · Weigh yourself without clothing at the same time each day. Record your weight. Call your doctor if you have a sudden weight gain, such as more than 2 to 3 pounds in a day or 5 pounds in a week. (Your doctor may suggest a different range of weight gain.) A sudden weight gain may mean that your heart failure is getting worse. Activity level    · Start light exercise (if your doctor says it is okay). Even if you can only do a small amount, exercise will help you get stronger, have more energy, and manage your weight and your stress. Walking is an easy way to get exercise. Start out by walking a little more than you did before. Bit by bit, increase the amount you walk.     · When you exercise, watch for signs that your heart is working too hard. You are pushing yourself too hard if you cannot talk while you are exercising. If you become short of breath or dizzy or have chest pain, stop, sit down, and rest.     · If you feel \"wiped out\" the day after you exercise, walk slower or for a shorter distance until you can work up to a better pace.     · Get enough rest at night. Sleeping with 1 or 2 pillows under your upper body and head may help you breathe easier. Lifestyle changes    · Do not smoke. Smoking can make a heart condition worse. If you need help quitting, talk to your doctor about stop-smoking programs and medicines. These can increase your chances of quitting for good.  Quitting smoking may be the most important step you can take to protect your heart.     · Limit alcohol to 2 drinks a day for men and 1 drink a day for women. Too much alcohol can cause health problems.     · Avoid getting sick from colds and the flu. Get a pneumococcal vaccine shot. If you have had one before, ask your doctor whether you need another dose. Get a flu shot each year. If you must be around people with colds or the flu, wash your hands often. When should you call for help? Call 911  if you have symptoms of sudden heart failure such as:    · You have severe trouble breathing.     · You cough up pink, foamy mucus.     · You have a new irregular or rapid heartbeat. Call your doctor now or seek immediate medical care if:    · You have new or increased shortness of breath.     · You are dizzy or lightheaded, or you feel like you may faint.     · You have sudden weight gain, such as more than 2 to 3 pounds in a day or 5 pounds in a week. (Your doctor may suggest a different range of weight gain.)     · You have increased swelling in your legs, ankles, or feet.     · You are suddenly so tired or weak that you cannot do your usual activities. Watch closely for changes in your health, and be sure to contact your doctor if you develop new symptoms. Where can you learn more? Go to http://www.gray.com/  Enter Y001 in the search box to learn more about \"Heart Failure: Care Instructions. \"  Current as of: April 29, 2021               Content Version: 13.0  © 7804-2518 [employer]. Care instructions adapted under license by [employer] (which disclaims liability or warranty for this information). If you have questions about a medical condition or this instruction, always ask your healthcare professional. [place] [address] any warranty or liability for your use of this information.

## 2022-01-20 NOTE — PROGRESS NOTES
Monitor room called to inform pt just had a 9 beat run of v-tach. Pt laying in bed resting quietly. No complaints at this time. Will notify MD via [employer] and continue to monitor. Order received to administer potassium 40mEq PO now.

## 2022-01-20 NOTE — PROGRESS NOTES
Hourly rounds performed this shift. Bed lowered and locked. Call light within reach. All needs met at this time. Bedside shift report will be given to oncoming nurse.

## 2022-01-21 NOTE — PROGRESS NOTES
ACUTE OT GOALS:  (Developed with and agreed upon by patient and/or caregiver.)  1. Patient will demonstrate good balance for ADL/functional transfers. MET 1/21/21    OCCUPATIONAL THERAPY ASSESSMENT: Initial Assessment and Daily Note OT Treatment Day # 1    Danielle Wren is a 70 y.o. male   PRIMARY DIAGNOSIS: CHF (congestive heart failure) ([facility])  CHF (congestive heart failure) ([facility] [address] [I50.9]       Reason for Referral:    ICD-10: Treatment Diagnosis: Generalized Muscle Weakness (M62.81)  Other lack of cordination (R27.8)  [place]PAT[place] / [place] / Product Type: Medicare /   ASSESSMENT:     REHAB RECOMMENDATIONS:   Recommendation to date pending progress:  Setting:   No further skilled therapy   Equipment:    None     PRIOR LEVEL OF FUNCTION:  (Prior to Hospitalization)  INITIAL/CURRENT LEVEL OF FUNCTION:  (Based on today's evaluation)   Bathing:   Independent  Dressing:   Independent  Feeding/Grooming:   Independent  Toileting:   Independent  Functional Mobility:   Independent Bathing:   Independent  Dressing:   Independent  Feeding/Grooming:   Independent  Toileting:   Independent  Functional Mobility:   initially supervision but progressed to independent     ASSESSMENT:  Mr. Faizan Hernandez presents to the hospital with CHF. Pt was standing in the room upon arrival and was fully dressed. Pt lives alone at baseline. It sounds like he has some good family support. Pt was able to complete toileting/grooming tasks independently today. Pt does need some cues to slow down (tripping over things in the room). Pt worked on some balance activities and overall did well. Pt is functioning at or close to his baseline with ADL and has not further OT needs at this time.       SUBJECTIVE:   Mr. Faizan Hernandez states, \"I've got a dog and four cats at home\"    SOCIAL HISTORY/LIVING ENVIRONMENT: Lives alone; typically has O2 as needed at home; no falls; walker/cane available; [address] [place] [address] Environment: Private residence  # Steps to Enter: 3  One/Two Story Residence: One story  Living Alone: Yes  Support Systems: Child(yane)    OBJECTIVE:     PAIN: VITAL SIGNS: LINES/DRAINS:   Pre Treatment: Pain Screen  Pain Scale 1: Numeric (0 - 10)  Pain Intensity 1: 0  Post Treatment: 0   none  O2 Device: Nasal cannula     GROSS EVALUATION:   Within Functional Limits Abnormal/ Functional Abnormal/ Non-Functional (see comments) Not Tested Comments:   AROM [x] [] [] []    PROM [x] [] [] []    Strength [] [x] [] [] Reports some weakness in R shoulder   Balance [x] [x] [] [] Fair+ to good   Posture [x] [] [] []    Sensation [] [] [] [x]    Coordination [x] [] [] []    Tone [x] [] [] []    Edema [x] [] [] []    Activity Tolerance [x] [] [] []     [] [] [] []      COGNITION/  PERCEPTION: Intact Impaired   (see comments) Comments:   Orientation [x] []    Vision [x] []    Hearing [x] []    Judgment/ Insight [x] []    Attention [x] []    Memory [x] []    Command Following [x] []    Emotional Regulation [x] []     [] []      ACTIVITIES OF DAILY LIVING: I Mod I S SBA CGA Min Mod Max Total NT Comments   BASIC ADLs:              Bathing/ Showering [] [] [] [] [] [] [] [] [] [x]    Toileting [x] [] [] [] [] [] [] [] [] []    Dressing [] [] [] [] [] [] [] [] [] [x]    Feeding [] [] [] [] [] [] [] [] [] [x]    Grooming [x] [] [] [] [] [] [] [] [] []    Personal Device Care [] [] [] [] [] [] [] [] [] [x]    Functional Mobility [x] [] [x] [] [] [] [] [] [] []    I=Independent, Mod I=Modified Independent, S=Supervision, SBA=Standby Assistance, CGA=Contact Guard Assistance,   Min=Minimal Assistance, Mod=Moderate Assistance, Max=Maximal Assistance, Total=Total Assistance, NT=Not Tested    MOBILITY: I Mod I S SBA CGA Min Mod Max Total  NT x2 Comments:   Supine to sit [] [] [] [] [] [] [] [] [] [x] []    Sit to supine [] [] [] [] [] [] [] [] [] [x] []    Sit to stand [] [] [] [] [] [] [] [] [] [x] []    Bed to chair [x] [] [] [] [] [] [] [] [] [] []    I=Independent, Mod I=Modified Independent, S=Supervision, SBA=Standby Assistance, CGA=Contact Guard Assistance,   Min=Minimal Assistance, Mod=Moderate Assistance, Max=Maximal Assistance, Total=Total Assistance, NT=Not [facility]   Daily Activity Inpatient Short Form        How much help from another person does the patient currently need. .. Total A Lot A Little None   1. Putting on and taking off regular lower body clothing? [] 1   [] 2   [] 3   [x] 4   2. Bathing (including washing, rinsing, drying)? [] 1   [] 2   [] 3   [x] 4   3. Toileting, which includes using toilet, bedpan or urinal?   [] 1   [] 2   [] 3   [x] 4   4. Putting on and taking off regular upper body clothing? [] 1   [] 2   [] 3   [x] 4   5. Taking care of personal grooming such as brushing teeth? [] 1   [] 2   [] 3   [x] 4   6. Eating meals? [] 1   [] 2   [] 3   [x] 4   © 2007, Trustees of [address] [place] [address], under license to [employer]. All rights reserved     Score:  Initial: 24 Most Recent: X (Date: -- )   Interpretation of Tool:  Represents activities that are increasingly more difficult (i.e. Bed mobility, Transfers, Gait). PLAN:   FREQUENCY/DURATION: OT Plan of Care:  (Discharge) for duration of hospital stay or until stated goals are met, whichever comes first.    PROBLEM LIST:   (Skilled intervention is medically necessary to address:)  1. Decreased Activity Tolerance  2. Decreased Balance   INTERVENTIONS PLANNED:   (Benefits and precautions of occupational therapy have been discussed with the patient.)  1.  Neuromuscular Re-education     TREATMENT:     EVALUATION: Low Complexity : (Untimed Charge)    TREATMENT:   ( $$ Neuromuscular Re-Education: 8-22 mins   )  Neuromuscular Re-education (8 Minutes): Neuromuscular Re-education included Balance Training, Coordination training, Functional mobility with facilitation, Postural training and Standing balance training to improve Balance, Coordination, Functional Mobility and Postural Control.     TREATMENT GRID:  N/A    AFTER TREATMENT POSITION/PRECAUTIONS:  Chair, Needs within reach and RN notified    INTERDISCIPLINARY COLLABORATION:  RN/PCT and OT/HAYES    TOTAL TREATMENT DURATION:  OT Patient Time In/Time Out  Time In: 4213  Time Out: [address], OT

## 2022-01-21 NOTE — PROGRESS NOTES
[facility] CARDIOLOGY PROGRESS NOTE    1/21/2022 8:24 AM    Admit Date: 1/18/2022        Subjective:   Stable overnight without angina, CHF, or palpitations. Vitals stable and controlled. Edema and breathing both improved with leg elevation and increased IV Lasix yesterday. I's and O's still not accurate but weight down to KG's since yesterday. Edema almost resolved. No other complaints overnight. Tolerating meds well. Objective:      Vitals:    01/20/22 1932 01/20/22 2325 01/21/22 0433 01/21/22 0712   BP: 92/67 93/68 100/71 110/73   Pulse: 98  98 97   Resp: 16 18 18 18   Temp: 97.7 °F (36.5 °C) 97.6 °F (36.4 °C) 97.8 °F (36.6 °C) 97.7 °F (36.5 °C)   SpO2: 99% 95% 99% 95%   Weight:   152 lb 8.9 oz (69.2 kg)    Height:           Physical Exam:  Neck- supple, 8 to 10 cm JVD at 60 degrees  CV- regular rate and rhythm, 2/6 apical and left parasternal systolic murmurs  Lung- clear bilaterally, mildly coarse bibasilar but no crackles as compared to yesterday  Abd- soft, nontender, nondistended  Ext-trace anterior tibial pitting edema bilaterally  Skin- warm and dry    Data Review:   Recent Labs     01/21/22  0644 01/20/22  2041 01/20/22  0534 01/20/22  0534   *  --   --  133*   K 3.6 4.1   < > 3.1*   MG 2.0  --   --  2.2   BUN 21  --   --  21   CREA 0.94  --   --  0.88   GLU 91  --   --  97   WBC 5.8  --   --  5.4   HGB 12.4*  --   --  12.3*   HCT 39.0*  --   --  38.7*     --   --  234    < > = values in this interval not displayed.        Assessment and Plan:         Principal Problem:    Acute on chronic systolic CHF (congestive heart failure) ([facility] [address] (1/19/2022)  - ECHO 1/19-  severely dilated LV,severely  reduced LV systolic function with EF 10-15%, severe MR, severe TR, RVSP 67mmHg  - IV diuresis with 80mg BID last night and this morning, convert to Bumex at discharge, see below  - on losartan, metoprolol succinate, spironolactone   - unable to tolerate Entresto in the past  - strict I&O's  - daily weights   - monitor daily labs while on diuresis  - in the past has declined ICD consideration       Active Problems:     NSVT  - EF 10-15%  - keep Mag >2.0 and K>4.0  - patient refused ICD in the past        Hypokalemia  - replaced, continue to monitor and replace as needed        Hyperlipidemia (8/27/2015)  - on atorvastatin   - per primary        Essential hypertension (8/27/2015)  - on losartan, metoprolol succinate  - monitor and titrate medications as needed        Chronic respiratory failure with hypoxia    Asbestosis ([facility] [address] (1/7/2021)  - on 3L NC   - per primary        Elevated LFTs  - hepatic panel pending   - per primary -improved today, likely due to passive hepatic congestion. Continue diuresis as tolerated. Okay to go home today from my standpoint  Continue current meds but convert to Bumex 1 mg twice daily x 3 days, then daily dosing thereafter with continuation of 25 mg Aldactone  Check basic metabolic panel, magnesium, and proBNP in 5 days  Needs transitional care 7 follow-up visit with Dr. Eleanor Titus.       Camilo Cordon MD  [facility]  Pager [phone]

## 2022-01-21 NOTE — ROUTINE PROCESS
CHF teaching completed to pt. Pass post test. Cardiac diet/ FR reinforced. Emphasis to report worsening dyspnea, daily WT gain. : 1 hr total

## 2022-01-21 NOTE — PROGRESS NOTES
CM discussed discharge planning with patient's daughter April via phone. Patient's daughter reports [facility] services are not needed at this time. CM discussed discharge planning with patient and patient's daughter. Daughter is in agreement with  RITESH submitting  DME Order to [facility] for a hospital bed and palliative care outpatient services with Charlie. CM to identify if preferred agency has palliative services available. CM continues to follow plan of care. Update 11:41am- Patient's daughter now requesting [facility] referral be placed for SN services only and to be submitted to [employer] [facility].  CM was receptive to this request.

## 2022-01-21 NOTE — DISCHARGE SUMMARY
Hospitalist Discharge Summary   Admit Date:  2022  9:43 PM   DC Note date: 2022  Name:  Yolanda Mckay   Age:  70 y.o. Sex:  male  :  1950   MRN:  [id number]   Room:  [facility]  PCP:  None    Presenting Complaint: Peripheral Edema and Shortness of Breath    Initial Admission Diagnosis: CHF (congestive heart failure) ([facility] [I50.9]     Problem List for this Hospitalization:  Hospital Problems as of 2022 Date Reviewed: 2022          Codes Class Noted - Resolved POA    * (Principal) CHF (congestive heart failure) ([facility] ICD-10-CM: I50.9  ICD-9-CM: 428.0  2022 - Present Yes        Asbestosis ([facility] ICD-10-CM: S76  ICD-9-CM: 613  2021 - Present Yes        Dyspnea on exertion (Chronic) ICD-10-CM: R06.00  ICD-9-CM: 786.09  2017 - Present Yes        Hyperlipidemia ICD-10-CM: E78.5  ICD-9-CM: 272.4  2015 - Present Yes        Essential hypertension ICD-10-CM: I10  ICD-9-CM: 401.9  2015 - Present Yes              Hospital Course:  Tammy Breauxr a 70 y. o. male with medical history of previous congestive heart failure unknown ejection fraction of around 15% based on his prior echocardiograms, asbestosis along with hypertension at baseline. Loy Willson presents to the hospital stating that he has been feeling poorly over the last week.  Minimal amounts of activity are making him feel short of breath.  He does endorse having some orthopnea with this as well.  He denies any chest pain, palpitations, cough, congestion or productive sputum.  He states that he does use oxygen as needed between 2 to 3 L and has been wearing his oxygen but despite this had no relief at home.  His legs have progressively become more swollen.  In the emergency room his BNP level was noted to be 5045.  Chest x-ray revealed cardiomegaly.  Clinically he does appear to be volume overloaded and likely having an exacerbation of his CHF so he was referred to the medical service for admission.       Patient started on IV lasix with good clinical diuresis (I/O's not accurate). He remains stable on his 2L NC which is what he uses at baseline. He was noted to have few runs of NSVT which was likely contributed to by his hypokalemia that is now corrected. He has refused defibillator. Echo shows EF 10-15% and Grade 2 DD. Daughter requested PC or [facility] services but patient declines this. Cardiology has changed lasix to bumex and Rx written as below. Patient was noted to have elevated LF\"ts likely congestive in nature. Hep panel negative. LFT's trending down with diuresis. Will defer any further testing/imaging to PCP. Patient to have close follow-up with Cardiology with repeat labs prior. He is medically stable for discharge and agreeable to plan.        Disposition: Home or Self Care  Diet: ADULT DIET Regular; Low Fat/Low Chol/High Fiber/2 gm Na; Low Sodium (2 gm); 1200 ml  Code Status: Full Code    Follow Up Orders: Follow-up Appointments   Procedures    FOLLOW UP VISIT Appointment in: One Week 1 week Cardiology, 1-2 weeks PCP. Will need repeat BMP, Mg and pro-BNP in 5 days- results to cardiology. 1 week Cardiology, 1-2 weeks PCP. Will need repeat BMP, Mg and pro-BNP in 5 days- results to cardiology. Standing Status:   Standing     Number of Occurrences:   1     Order Specific Question:   Appointment in     Answer: One Week       Follow-up Information     Follow up With Specialties Details Why [facility]  On 1/26/2022 Dr. Barragan Box at 8:15AM [address] [place] [address]  [phone]    None    None (256) Patient stated that they have no PCP            Follow up labs/diagnostics (ultimately defer to outpatient provider):  Cardiology 1 week with BMP, pro BNP, mg in 5 days (results to cardiology)    Time spent in patient discharge and coordination 34 minutes. Plan was discussed with RITESH, RN, patient. All questions answered. Patient was stable at time of discharge. Instructions given to call a physician or return if any concerns. Discharge Info:   Current Discharge Medication List      START taking these medications    Details   bumetanide (BUMEX) 1 mg tablet One tab by mouth twice daily x 3 days then decrease to once daily dosing. Qty: 45 Tablet, Refills: 0  Start date: 1/21/2022         CONTINUE these medications which have NOT CHANGED    Details   aspirin delayed-release 81 mg tablet Take 81 mg by mouth daily. metoprolol succinate (TOPROL-XL) 25 mg XL tablet Take 1 Tablet by mouth daily. Qty: 30 Tablet, Refills: 5    Associated Diagnoses: Chronic HFrEF (heart failure with reduced ejection fraction) ([address] Cardiomyopathy, unspecified type ([facility])      potassium chloride (K-DUR, KLOR-CON) 20 mEq tablet Take 1 Tab by mouth daily. Qty: 90 Tab, Refills: 3    Associated Diagnoses: Acute on chronic systolic heart failure ([facility] Cardiomyopathy, unspecified type ([facility] Chronic HFrEF (heart failure with reduced ejection fraction) ([facility])      TURMERIC PO Take  by mouth.      multivitamin (ONE A DAY) tablet Take 1 Tab by mouth daily. spironolactone (ALDACTONE) 25 mg tablet Take 25 mg by mouth daily. fluticasone-umeclidinium-vilanterol (Trelegy Ellipta) 100-62.5-25 mcg inhaler Take 1 Puff by inhalation daily. STOP taking these medications       furosemide (Lasix) 40 mg tablet Comments:   Reason for Stopping:         sacubitriL-valsartan (Entresto) 24-26 mg tablet Comments:   Reason for Stopping:               Procedures done this admission:  * No surgery found *    Consults this admission:  IP CONSULT TO CARDIOLOGY    Echocardiogram/EKG results:  Results from Hospital Encounter encounter on 01/18/22    ECHO ADULT COMPLETE    Interpretation Summary    Left Ventricle: Left ventricle is severely dilated. Normal wall thickness. Normal wall motion. Grade II diastolic dysfunction with increased LAP.  Severely reduced left ventricular systolic function with a visually estimated EF of 10 -15%.   Left Atrium: Left atrium is severely dilated.   Right Atrium: Right atrium is mildly dilated.   Aortic Valve: Mild sclerosis of the aortic valve cusps.   Mitral Valve: Severe transvalvular regurgitation.   Tricuspid Valve: Severe transvalvular regurgitation. Severely elevated RVSP. EKG Results     Procedure 720 Value Units Date/Time    EKG [[id number]] Collected: 01/18/22 1453    Order Status: Completed Updated: 01/18/22 1639     Ventricular Rate 109 BPM      Atrial Rate 109 BPM      P-R Interval 180 ms      QRS Duration 116 ms      Q-T Interval 358 ms      QTC Calculation (Bezet) 482 ms      Calculated P Axis 73 degrees      Calculated R Axis -4 degrees      Calculated T Axis 71 degrees      Diagnosis --     Sinus tachycardia with occasional Premature ventricular complexes and Fusion   complexes  Possible Left atrial enlargement  Incomplete left bundle branch block  Nonspecific T wave abnormality  Abnormal ECG  When compared with ECG of 09-JUL-2013 11:17,  Fusion complexes are now Present  Premature ventricular complexes are now Present  Incomplete left bundle branch block is now Present  Confirmed by Carlos Pierre MD (), MAYE CHIN ([id number]) on 1/18/2022 4:39:32 PM            Diagnostic Imaging/Tests:   ECHO ADULT COMPLETE    Result Date: 1/19/2022    Left Ventricle: Left ventricle is severely dilated. Normal wall thickness. Normal wall motion. Grade II diastolic dysfunction with increased LAP. Severely reduced left ventricular systolic function with a visually estimated EF of 10 -15%.   Left Atrium: Left atrium is severely dilated.   Right Atrium: Right atrium is mildly dilated.   Aortic Valve: Mild sclerosis of the aortic valve cusps.   Mitral Valve: Severe transvalvular regurgitation.   Tricuspid Valve: Severe transvalvular regurgitation. Severely elevated RVSP.        All Micro Results     Procedure Component Value Units Date/Time    COVID-19 RAPID TEST [[id number]] Collected: 01/18/22 2208    Order Status: Completed Specimen: Nasopharyngeal Updated: 01/18/22 2311     Specimen source Nasopharyngeal        COVID-19 rapid test Not detected        Comment:      The specimen is NEGATIVE for SARS-CoV-2, the novel coronavirus associated with COVID-19. A negative result does not rule out COVID-19. This test has been authorized by the FDA under an Emergency Use Authorization (EUA) for use by authorized laboratories. Fact sheet for Healthcare Providers: ConventionUpdate.co.nz  Fact sheet for Patients: ConventionUpdate.co.nz       Methodology: Isothermal Nucleic Acid Amplification               Labs: Results:       BMP, Mg, Phos Recent Labs     01/21/22  0644 01/20/22  2041 01/20/22  0534 01/19/22 2324 01/19/22 2324 01/18/22  1506 01/18/22  1506   *  --  133*  --   --   --  132*   K 3.6 4.1 3.1*   < > 3.1*   < > 3.2*   CL 96*  --  94*  --   --   --  94*   CO2 28  --  30  --   --   --  29   AGAP 9  --  9  --   --   --  9   BUN 21  --  21  --   --   --  21   CREA 0.94  --  0.88  --   --   --  1.10   CA 8.8  --  9.0  --   --   --  9.4   GLU 91  --  97  --   --   --  119*   MG 2.0  --  2.2  --  2.2   < > 1.9    < > = values in this interval not displayed.       CBC Recent Labs     01/21/22  0644 01/20/22  0534 01/18/22  1506   WBC 5.8 5.4 7.7   RBC 4.11* 4.10* 4.56   HGB 12.4* 12.3* 13.8   HCT 39.0* 38.7* 42.5    234 285   GRANS 69 66 80*   LYMPH 19 22 12*   EOS 3 4 2   MONOS 7 8 6   BASOS 1 1 1   IG 0 0 0   ANEU 4.0 3.5 6.1   ABL 1.1 1.2 0.9   RAYMON 0.2 0.2 0.1   ABM 0.4 0.4 0.5   ABB 0.1 0.0 0.0   AIG 0.0 0.0 0.0      LFT Recent Labs     01/21/22  0644 01/20/22  0534 01/18/22  1506   * 133* 130*   AP 80 83 104   TP 6.6 6.6 7.7   ALB 2.8* 2.9* 3.3   GLOB 3.8* 3.7* 4.4*   AGRAT 0.7* 0.8* 0.8*      Cardiac Testing No results found for: BNPP, BNP, CPK, RCK1, RCK2, RCK3, RCK4, CKMB, CKNDX, CKND1, TROPT, TROIQ   Coagulation Tests Lab Results   Component Value Date/Time    Prothrombin time 10.2 07/09/2013 11:26 AM    INR 1.0 07/09/2013 11:26 AM    aPTT 27.3 07/09/2013 11:26 AM      A1c No results found for: HBA1C, [id number]   Lipid Panel No results found for: CHOL, CHOLPOCT, CHOLX, CHLST, CHOLV, 774506, HDL, HDLP, LDL, LDLC, DLDLP, 659810, VLDLC, VLDL, TGLX, TRIGL, TRIGP, TGLPOCT, CHHD, CHHDX   Thyroid Panel No results found for: TSH, T4, FT4, TT3, T3U, TSHEXT     Most Recent UA No results found for: COLOR, APPRN, REFSG, AR, PROTU, GLUCU, KETU, BILU, BLDU, UROU, PUSHPA, LEUKU, WBCU, RBCU, UEPI, BACTU, CASTS, UCRY, MUCUS, UCOM       All Labs from Last 24 Hrs:  Recent Results (from the past 24 hour(s))   POTASSIUM    Collection Time: 01/20/22  8:41 PM   Result Value Ref Range    Potassium 4.1 3.5 - 5.1 mmol/L   METABOLIC PANEL, COMPREHENSIVE    Collection Time: 01/21/22  6:44 AM   Result Value Ref Range    Sodium 133 (L) 138 - 145 mmol/L    Potassium 3.6 3.5 - 5.1 mmol/L    Chloride 96 (L) 98 - 107 mmol/L    CO2 28 21 - 32 mmol/L    Anion gap 9 7 - 16 mmol/L    Glucose 91 65 - 100 mg/dL    BUN 21 8 - 23 MG/DL    Creatinine 0.94 0.8 - 1.5 MG/DL    GFR est AA >60 >60 ml/min/1.73m2    GFR est non-AA >60 >60 ml/min/1.73m2    Calcium 8.8 8.3 - 10.4 MG/DL    Bilirubin, total 1.7 (H) 0.2 - 1.1 MG/DL    ALT (SGPT) 113 (H) 12 - 65 U/L    AST (SGOT) 67 (H) 15 - 37 U/L    Alk.  phosphatase 80 50 - 136 U/L    Protein, total 6.6 6.3 - 8.2 g/dL    Albumin 2.8 (L) 3.2 - 4.6 g/dL    Globulin 3.8 (H) 2.3 - 3.5 g/dL    A-G Ratio 0.7 (L) 1.2 - 3.5     MAGNESIUM    Collection Time: 01/21/22  6:44 AM   Result Value Ref Range    Magnesium 2.0 1.8 - 2.4 mg/dL   CBC WITH AUTOMATED DIFF    Collection Time: 01/21/22  6:44 AM   Result Value Ref Range    WBC 5.8 4.3 - 11.1 K/uL    RBC 4.11 (L) 4.23 - 5.6 M/uL    HGB 12.4 (L) 13.6 - 17.2 g/dL    HCT 39.0 (L) 41.1 - 50.3 %    MCV 94.9 79.6 - 97.8 FL    MCH 30.2 26.1 - 32.9 PG    MCHC 31.8 31.4 - 35.0 g/dL    RDW 14.1 11.9 - 14.6 %    PLATELET 778 773 - 844 K/uL    MPV 10.0 9.4 - 12.3 FL    ABSOLUTE NRBC 0.00 0.0 - 0.2 K/uL    DF AUTOMATED      NEUTROPHILS 69 43 - 78 %    LYMPHOCYTES 19 13 - 44 %    MONOCYTES 7 4.0 - 12.0 %    EOSINOPHILS 3 0.5 - 7.8 %    BASOPHILS 1 0.0 - 2.0 %    IMMATURE GRANULOCYTES 0 0.0 - 5.0 %    ABS. NEUTROPHILS 4.0 1.7 - 8.2 K/UL    ABS. LYMPHOCYTES 1.1 0.5 - 4.6 K/UL    ABS. MONOCYTES 0.4 0.1 - 1.3 K/UL    ABS. EOSINOPHILS 0.2 0.0 - 0.8 K/UL    ABS. BASOPHILS 0.1 0.0 - 0.2 K/UL    ABS. IMM.  GRANS. 0.0 0.0 - 0.5 K/UL       Current Med List in Hospital:   Current Facility-Administered Medications   Medication Dose Route Frequency    mv,Ca,min-folic acid-vit K1 (OPTIFAST) chewable tablet 1 Tablet  1 Tablet Oral DAILY    furosemide (LASIX) injection 80 mg  80 mg IntraVENous Q12H    melatonin tablet 5 mg  5 mg Oral QHS PRN    aspirin delayed-release tablet 81 mg  81 mg Oral DAILY    atorvastatin (LIPITOR) tablet 20 mg  20 mg Oral DAILY    pantoprazole (PROTONIX) tablet 40 mg  40 mg Oral ACB    losartan (COZAAR) tablet 50 mg  50 mg Oral DAILY    metoprolol succinate (TOPROL-XL) XL tablet 25 mg  25 mg Oral DAILY    spironolactone (ALDACTONE) tablet 25 mg  25 mg Oral DAILY    sodium chloride (NS) flush 5-40 mL  5-40 mL IntraVENous Q8H    sodium chloride (NS) flush 5-40 mL  5-40 mL IntraVENous PRN    acetaminophen (TYLENOL) tablet 650 mg  650 mg Oral Q6H PRN    Or    acetaminophen (TYLENOL) suppository 650 mg  650 mg Rectal Q6H PRN    polyethylene glycol (MIRALAX) packet 17 g  17 g Oral DAILY PRN    ondansetron (ZOFRAN ODT) tablet 4 mg  4 mg Oral Q8H PRN    Or    ondansetron (ZOFRAN) injection 4 mg  4 mg IntraVENous Q6H PRN    enoxaparin (LOVENOX) injection 40 mg  40 mg SubCUTAneous DAILY    sodium chloride (NS) flush 5-10 mL  5-10 mL IntraVENous Q8H    sodium chloride (NS) flush 5-10 mL  5-10 mL IntraVENous PRN       Allergies   Allergen Reactions    Latex Unknown (comments)     GLOVES    Lisinopril Unknown (comments)     Pt. States he is not allergic to lisinopril     Immunization History   Administered Date(s) Administered    TB Skin Test (PPD) Intradermal 01/19/2022       Recent Vital Data:  Patient Vitals for the past 24 hrs:   Temp Pulse Resp BP SpO2   01/21/22 1103 98 °F (36.7 °C) (!) 103 18 102/73 96 %   01/21/22 0712 97.7 °F (36.5 °C) 97 18 110/73 95 %   01/21/22 0433 97.8 °F (36.6 °C) 98 18 100/71 99 %   01/20/22 2325 97.6 °F (36.4 °C)  18 93/68 95 %   01/20/22 1932 97.7 °F (36.5 °C) 98 16 92/67 99 %   01/20/22 1631 97.6 °F (36.4 °C) 97 18 93/70 99 %     Oxygen Therapy  O2 Sat (%): 96 % (01/21/22 1103)  Pulse via Oximetry: 108 beats per minute (01/19/22 1221)  O2 Device: Nasal cannula (01/20/22 1932)  Skin Assessment: Clean, dry, & intact (01/19/22 1927)  O2 Flow Rate (L/min): 3 l/min (01/20/22 1932)    Estimated body mass index is 23.2 kg/m² as calculated from the following:    Height as of 1/19/22: 5' 8\" (1.727 m). Weight as of this encounter: 69.2 kg (152 lb 8.9 oz). Intake/Output Summary (Last 24 hours) at 1/21/2022 1245  Last data filed at 1/21/2022 0437  Gross per 24 hour   Intake 600 ml   Output 850 ml   Net -250 ml         Physical Exam:    General:    Well nourished. No overt distress  Head:  Normocephalic, atraumatic  Eyes:  Sclerae appear normal.  Pupils equally round. HENT:  Nares appear normal, no drainage. Moist mucous membranes  Neck:  No restricted ROM. Trachea midline  CV:   RRR. No m/r/g. No JVD  Lungs:   CTAB. No wheezing, rhonchi, or rales. Even, unlabored  Abdomen:   Soft, nontender, nondistended. Extremities: Warm and dry. No cyanosis or clubbing. Trace pitting edema b/l  Skin:     No rashes. Normal coloration  Neuro:  CN II-XII grossly intact. Psych:  Normal mood and affect. Signed:  Vincenzo Ramirez DO    Part of this note may have been written by using a voice dictation software.   The note has been proof read but may still contain some grammatical/other typographical errors.

## 2022-01-21 NOTE — PROGRESS NOTES
The patient is medically stable for discharge. The patient does not want a hospital bed at this time. Patient is agreeable to [facility] therapy. Referral sent to [facility], as requested by family. Patient provided with Palliative Care Outpatient Resources, as patient is not agreeable to Palliative in home services at this time. Patient reports having home oxygen used PRN. Patient to discharge home with [facility] [employer] [facility]. Referral faxed. Patient's ride delayed. Patient's daughter to transport the patient home at tentatively [phone]. CM remains available. Care Management Interventions  PCP Verified by CM:  (Last PCP visit per [facility] November 2021.)  Transition of Care Consult (CM Consult): [address]: No  Reason Outside [place]:  (Per family request. )  MyChart Signup: No  Discharge Durable Medical Equipment: No  Physical Therapy Consult: No  Occupational Therapy Consult: No  Speech Therapy Consult: No  Support Systems: Child(yane)  Confirm Follow Up Transport: Family  The Plan for Transition of Care is Related to the Following Treatment Goals : Return to baseline.   Discharge Location  Patient Expects to be Discharged to[de-identified] Home with home health [facility]. )
